# Patient Record
Sex: FEMALE | Race: WHITE | URBAN - METROPOLITAN AREA
[De-identification: names, ages, dates, MRNs, and addresses within clinical notes are randomized per-mention and may not be internally consistent; named-entity substitution may affect disease eponyms.]

---

## 2017-01-17 ENCOUNTER — OFFICE VISIT (OUTPATIENT)
Dept: FAMILY MEDICINE CLINIC | Age: 82
End: 2017-01-17

## 2017-01-17 VITALS — SYSTOLIC BLOOD PRESSURE: 110 MMHG | OXYGEN SATURATION: 97 % | DIASTOLIC BLOOD PRESSURE: 54 MMHG | HEART RATE: 80 BPM

## 2017-01-17 DIAGNOSIS — R29.6 FREQUENT FALLS: ICD-10-CM

## 2017-01-17 DIAGNOSIS — M12.522 TRAUMATIC ARTHRITIS ELBOW, LEFT: Primary | ICD-10-CM

## 2017-01-17 DIAGNOSIS — I10 ESSENTIAL HYPERTENSION: ICD-10-CM

## 2017-01-17 DIAGNOSIS — Z23 NEED FOR INFLUENZA VACCINATION: ICD-10-CM

## 2017-01-17 DIAGNOSIS — R56.9 SEIZURES (HCC): ICD-10-CM

## 2017-01-17 DIAGNOSIS — F32.89 DEPRESSIVE DISORDER, NOT ELSEWHERE CLASSIFIED: ICD-10-CM

## 2017-01-17 DIAGNOSIS — E03.9 HYPOTHYROIDISM, UNSPECIFIED TYPE: ICD-10-CM

## 2017-01-17 PROBLEM — M12.529: Status: ACTIVE | Noted: 2017-01-17

## 2017-01-17 PROCEDURE — 99214 OFFICE O/P EST MOD 30 MIN: CPT | Performed by: FAMILY MEDICINE

## 2017-01-17 PROCEDURE — 90662 IIV NO PRSV INCREASED AG IM: CPT | Performed by: FAMILY MEDICINE

## 2017-01-17 PROCEDURE — G0008 ADMIN INFLUENZA VIRUS VAC: HCPCS | Performed by: FAMILY MEDICINE

## 2017-01-25 RX ORDER — PHENYTOIN SODIUM 100 MG/1
CAPSULE, EXTENDED RELEASE ORAL
Qty: 60 CAPSULE | Refills: 2 | Status: SHIPPED | OUTPATIENT
Start: 2017-01-25 | End: 2017-04-26 | Stop reason: SDUPTHER

## 2017-02-06 RX ORDER — CLOPIDOGREL BISULFATE 75 MG/1
TABLET ORAL
Qty: 90 TABLET | Refills: 1 | Status: SHIPPED | OUTPATIENT
Start: 2017-02-06 | End: 2017-07-31 | Stop reason: SDUPTHER

## 2017-02-06 RX ORDER — LEVOTHYROXINE SODIUM 0.12 MG/1
TABLET ORAL
Qty: 90 TABLET | Refills: 1 | Status: SHIPPED | OUTPATIENT
Start: 2017-02-06 | End: 2017-07-31 | Stop reason: SDUPTHER

## 2017-02-13 ENCOUNTER — TELEPHONE (OUTPATIENT)
Dept: FAMILY MEDICINE CLINIC | Age: 82
End: 2017-02-13

## 2017-02-13 RX ORDER — AMITRIPTYLINE HYDROCHLORIDE 50 MG/1
25 TABLET, FILM COATED ORAL NIGHTLY
Qty: 15 TABLET | Refills: 3 | Status: SHIPPED | OUTPATIENT
Start: 2017-02-13 | End: 2017-02-15 | Stop reason: SDUPTHER

## 2017-02-15 RX ORDER — AMITRIPTYLINE HYDROCHLORIDE 50 MG/1
25 TABLET, FILM COATED ORAL NIGHTLY
Qty: 15 TABLET | Refills: 3 | Status: SHIPPED | OUTPATIENT
Start: 2017-02-15 | End: 2017-04-24 | Stop reason: SDUPTHER

## 2017-02-20 DIAGNOSIS — I10 UNSPECIFIED ESSENTIAL HYPERTENSION: ICD-10-CM

## 2017-02-20 RX ORDER — LISINOPRIL 10 MG/1
10 TABLET ORAL DAILY
Qty: 90 TABLET | Refills: 1 | Status: SHIPPED | OUTPATIENT
Start: 2017-02-20 | End: 2017-07-31 | Stop reason: SDUPTHER

## 2017-02-27 DIAGNOSIS — I10 UNSPECIFIED ESSENTIAL HYPERTENSION: ICD-10-CM

## 2017-02-27 RX ORDER — AMLODIPINE BESYLATE 10 MG/1
5 TABLET ORAL DAILY
Qty: 30 TABLET | Refills: 3 | Status: SHIPPED | OUTPATIENT
Start: 2017-02-27 | End: 2017-05-17 | Stop reason: SDUPTHER

## 2017-02-28 ENCOUNTER — NURSE ONLY (OUTPATIENT)
Dept: FAMILY MEDICINE CLINIC | Age: 82
End: 2017-02-28

## 2017-02-28 ENCOUNTER — TELEPHONE (OUTPATIENT)
Dept: FAMILY MEDICINE CLINIC | Age: 82
End: 2017-02-28

## 2017-02-28 DIAGNOSIS — R10.9 ABDOMINAL PAIN, UNSPECIFIED LOCATION: Primary | ICD-10-CM

## 2017-02-28 LAB
BACTERIA URINE, POC: 0
BILIRUBIN URINE: 0 MG/DL
BLOOD, URINE: NEGATIVE
CASTS URINE, POC: 0
CLARITY: CLEAR
COLOR: YELLOW
CRYSTALS URINE, POC: 0
EPI CELLS URINE, POC: 0
GLUCOSE URINE: 0
KETONES, URINE: NEGATIVE
LEUKOCYTE EST, POC: 0
NITRITE, URINE: NEGATIVE
PH UA: 6.5 (ref 4.5–8)
PROTEIN UA: NEGATIVE
RBC URINE, POC: 0
SPECIFIC GRAVITY UA: 1.01 (ref 1–1.03)
UROBILINOGEN, URINE: NORMAL
WBC URINE, POC: 0
YEAST URINE, POC: 0

## 2017-02-28 PROCEDURE — 81000 URINALYSIS NONAUTO W/SCOPE: CPT | Performed by: PHYSICIAN ASSISTANT

## 2017-03-16 RX ORDER — CYANOCOBALAMIN 1000 UG/ML
1000 INJECTION INTRAMUSCULAR; SUBCUTANEOUS
Qty: 1 ML | Refills: 3 | Status: SHIPPED | OUTPATIENT
Start: 2017-03-16 | End: 2017-03-23 | Stop reason: SDUPTHER

## 2017-03-16 RX ORDER — CYANOCOBALAMIN 1000 UG/ML
1000 INJECTION INTRAMUSCULAR; SUBCUTANEOUS
Qty: 1 ML | Refills: 3 | Status: SHIPPED | OUTPATIENT
Start: 2017-03-16 | End: 2017-03-16 | Stop reason: SDUPTHER

## 2017-03-23 RX ORDER — CYANOCOBALAMIN 1000 UG/ML
1000 INJECTION INTRAMUSCULAR; SUBCUTANEOUS
Qty: 3 ML | Refills: 1 | Status: SHIPPED | OUTPATIENT
Start: 2017-03-23 | End: 2017-11-03 | Stop reason: SDUPTHER

## 2017-03-28 RX ORDER — GABAPENTIN 300 MG/1
300 CAPSULE ORAL NIGHTLY
Qty: 90 CAPSULE | Refills: 1 | Status: SHIPPED | OUTPATIENT
Start: 2017-03-28 | End: 2017-07-31 | Stop reason: SDUPTHER

## 2017-04-24 RX ORDER — AMITRIPTYLINE HYDROCHLORIDE 50 MG/1
25 TABLET, FILM COATED ORAL NIGHTLY
Qty: 45 TABLET | Refills: 0 | Status: SHIPPED | OUTPATIENT
Start: 2017-04-24 | End: 2017-04-25 | Stop reason: SDUPTHER

## 2017-04-25 RX ORDER — DOCUSATE SODIUM 100 MG/1
CAPSULE, LIQUID FILLED ORAL
Qty: 90 CAPSULE | Refills: 0 | Status: SHIPPED | OUTPATIENT
Start: 2017-04-25 | End: 2017-07-24 | Stop reason: SDUPTHER

## 2017-04-25 RX ORDER — AMITRIPTYLINE HYDROCHLORIDE 50 MG/1
25 TABLET, FILM COATED ORAL NIGHTLY
Qty: 45 TABLET | Refills: 0 | Status: SHIPPED | OUTPATIENT
Start: 2017-04-25 | End: 2017-05-17 | Stop reason: SDUPTHER

## 2017-04-26 RX ORDER — PHENYTOIN SODIUM 100 MG/1
CAPSULE, EXTENDED RELEASE ORAL
Qty: 60 CAPSULE | Refills: 2 | Status: SHIPPED | OUTPATIENT
Start: 2017-04-26 | End: 2017-05-17 | Stop reason: SDUPTHER

## 2017-05-17 ENCOUNTER — OFFICE VISIT (OUTPATIENT)
Dept: FAMILY MEDICINE CLINIC | Age: 82
End: 2017-05-17

## 2017-05-17 VITALS
SYSTOLIC BLOOD PRESSURE: 122 MMHG | DIASTOLIC BLOOD PRESSURE: 70 MMHG | HEIGHT: 67 IN | BODY MASS INDEX: 23.93 KG/M2 | RESPIRATION RATE: 16 BRPM | WEIGHT: 152.5 LBS | HEART RATE: 68 BPM

## 2017-05-17 DIAGNOSIS — M19.91 PRIMARY OSTEOARTHRITIS, UNSPECIFIED SITE: ICD-10-CM

## 2017-05-17 DIAGNOSIS — G60.9 IDIOPATHIC PERIPHERAL NEUROPATHY: Primary | ICD-10-CM

## 2017-05-17 DIAGNOSIS — I10 UNSPECIFIED ESSENTIAL HYPERTENSION: ICD-10-CM

## 2017-05-17 DIAGNOSIS — G20 PARKINSON DISEASE (HCC): ICD-10-CM

## 2017-05-17 PROCEDURE — 99214 OFFICE O/P EST MOD 30 MIN: CPT | Performed by: FAMILY MEDICINE

## 2017-05-17 RX ORDER — AMITRIPTYLINE HYDROCHLORIDE 25 MG/1
25 TABLET, FILM COATED ORAL NIGHTLY
Qty: 90 TABLET | Refills: 3 | Status: SHIPPED | OUTPATIENT
Start: 2017-05-17 | End: 2018-05-22 | Stop reason: SDUPTHER

## 2017-05-17 RX ORDER — AMLODIPINE BESYLATE 5 MG/1
5 TABLET ORAL DAILY
Qty: 90 TABLET | Refills: 3 | Status: SHIPPED | OUTPATIENT
Start: 2017-05-17 | End: 2017-07-31 | Stop reason: SDUPTHER

## 2017-05-17 RX ORDER — PHENYTOIN SODIUM 100 MG/1
CAPSULE, EXTENDED RELEASE ORAL
Qty: 180 CAPSULE | Refills: 1 | Status: SHIPPED | OUTPATIENT
Start: 2017-05-17 | End: 2017-10-24 | Stop reason: SDUPTHER

## 2017-05-17 ASSESSMENT — PATIENT HEALTH QUESTIONNAIRE - PHQ9
2. FEELING DOWN, DEPRESSED OR HOPELESS: 0
SUM OF ALL RESPONSES TO PHQ QUESTIONS 1-9: 0
SUM OF ALL RESPONSES TO PHQ9 QUESTIONS 1 & 2: 0
1. LITTLE INTEREST OR PLEASURE IN DOING THINGS: 0

## 2017-07-24 RX ORDER — DOCUSATE SODIUM 100 MG/1
CAPSULE, LIQUID FILLED ORAL
Qty: 90 CAPSULE | Refills: 0 | Status: SHIPPED | OUTPATIENT
Start: 2017-07-24 | End: 2017-10-23 | Stop reason: SDUPTHER

## 2017-07-31 ENCOUNTER — TELEPHONE (OUTPATIENT)
Dept: FAMILY MEDICINE CLINIC | Age: 82
End: 2017-07-31

## 2017-07-31 DIAGNOSIS — I10 UNSPECIFIED ESSENTIAL HYPERTENSION: ICD-10-CM

## 2017-07-31 RX ORDER — GABAPENTIN 300 MG/1
300 CAPSULE ORAL NIGHTLY
Qty: 90 CAPSULE | Refills: 1 | Status: SHIPPED | OUTPATIENT
Start: 2017-07-31 | End: 2018-01-09 | Stop reason: SDUPTHER

## 2017-07-31 RX ORDER — AMLODIPINE BESYLATE 5 MG/1
5 TABLET ORAL DAILY
Qty: 90 TABLET | Refills: 1 | Status: SHIPPED | OUTPATIENT
Start: 2017-07-31 | End: 2017-12-24 | Stop reason: SDUPTHER

## 2017-07-31 RX ORDER — CLOPIDOGREL BISULFATE 75 MG/1
TABLET ORAL
Qty: 90 TABLET | Refills: 1 | Status: SHIPPED | OUTPATIENT
Start: 2017-07-31 | End: 2017-08-01 | Stop reason: SDUPTHER

## 2017-07-31 RX ORDER — LISINOPRIL 10 MG/1
10 TABLET ORAL DAILY
Qty: 90 TABLET | Refills: 1 | Status: SHIPPED | OUTPATIENT
Start: 2017-07-31 | End: 2017-12-11 | Stop reason: SDUPTHER

## 2017-07-31 RX ORDER — LEVOTHYROXINE SODIUM 0.12 MG/1
TABLET ORAL
Qty: 90 TABLET | Refills: 1 | Status: SHIPPED | OUTPATIENT
Start: 2017-07-31 | End: 2017-08-01 | Stop reason: SDUPTHER

## 2017-08-01 RX ORDER — CLOPIDOGREL BISULFATE 75 MG/1
TABLET ORAL
Qty: 90 TABLET | Refills: 1 | Status: SHIPPED | OUTPATIENT
Start: 2017-08-01 | End: 2018-02-27 | Stop reason: SDUPTHER

## 2017-08-01 RX ORDER — LEVOTHYROXINE SODIUM 0.12 MG/1
TABLET ORAL
Qty: 90 TABLET | Refills: 1 | Status: SHIPPED | OUTPATIENT
Start: 2017-08-01 | End: 2017-08-03 | Stop reason: SDUPTHER

## 2017-08-03 RX ORDER — LEVOTHYROXINE SODIUM 0.12 MG/1
TABLET ORAL
Qty: 90 TABLET | Refills: 0 | Status: SHIPPED | OUTPATIENT
Start: 2017-08-03 | End: 2018-02-27 | Stop reason: SDUPTHER

## 2017-09-11 ENCOUNTER — OFFICE VISIT (OUTPATIENT)
Dept: FAMILY MEDICINE CLINIC | Age: 82
End: 2017-09-11

## 2017-09-11 VITALS
WEIGHT: 152 LBS | OXYGEN SATURATION: 98 % | HEART RATE: 80 BPM | BODY MASS INDEX: 23.81 KG/M2 | SYSTOLIC BLOOD PRESSURE: 122 MMHG | DIASTOLIC BLOOD PRESSURE: 70 MMHG

## 2017-09-11 DIAGNOSIS — I10 ESSENTIAL HYPERTENSION: ICD-10-CM

## 2017-09-11 DIAGNOSIS — G20 PARKINSON DISEASE (HCC): ICD-10-CM

## 2017-09-11 DIAGNOSIS — G60.9 IDIOPATHIC PERIPHERAL NEUROPATHY: ICD-10-CM

## 2017-09-11 DIAGNOSIS — M19.91 PRIMARY OSTEOARTHRITIS, UNSPECIFIED SITE: Primary | ICD-10-CM

## 2017-09-11 DIAGNOSIS — Z23 NEED FOR INFLUENZA VACCINATION: ICD-10-CM

## 2017-09-11 DIAGNOSIS — G93.32 CHRONIC FATIGUE SYNDROME: ICD-10-CM

## 2017-09-11 PROCEDURE — 90662 IIV NO PRSV INCREASED AG IM: CPT | Performed by: FAMILY MEDICINE

## 2017-09-11 PROCEDURE — 99214 OFFICE O/P EST MOD 30 MIN: CPT | Performed by: FAMILY MEDICINE

## 2017-09-11 PROCEDURE — G0008 ADMIN INFLUENZA VIRUS VAC: HCPCS | Performed by: FAMILY MEDICINE

## 2017-10-23 RX ORDER — DOCUSATE SODIUM 100 MG/1
CAPSULE, LIQUID FILLED ORAL
Qty: 90 CAPSULE | Refills: 1 | Status: SHIPPED | OUTPATIENT
Start: 2017-10-23 | End: 2018-04-18 | Stop reason: SDUPTHER

## 2017-10-24 RX ORDER — PHENYTOIN SODIUM 100 MG/1
CAPSULE, EXTENDED RELEASE ORAL
Qty: 180 CAPSULE | Refills: 1 | Status: SHIPPED | OUTPATIENT
Start: 2017-10-24 | End: 2017-10-26 | Stop reason: SDUPTHER

## 2017-10-26 ENCOUNTER — TELEPHONE (OUTPATIENT)
Dept: FAMILY MEDICINE CLINIC | Age: 82
End: 2017-10-26

## 2017-10-26 RX ORDER — PHENYTOIN SODIUM 100 MG/1
CAPSULE, EXTENDED RELEASE ORAL
Qty: 14 CAPSULE | Refills: 0 | Status: SHIPPED | OUTPATIENT
Start: 2017-10-26 | End: 2018-01-17

## 2017-10-26 NOTE — TELEPHONE ENCOUNTER
[facility] called concerning the prescrition for    phenytoin (DILANTIN) 100 MG ER capsule [[id number]]    This was sent to the wrong pharmacy. It went to [facility] but it needs to go to [facility] on [address]. Please resend.  Patient is almost out of medication

## 2017-11-03 RX ORDER — CYANOCOBALAMIN 1000 UG/ML
1000 INJECTION INTRAMUSCULAR; SUBCUTANEOUS
Qty: 3 ML | Refills: 1 | Status: SHIPPED | OUTPATIENT
Start: 2017-11-03 | End: 2018-04-24 | Stop reason: SDUPTHER

## 2017-11-03 RX ORDER — CYANOCOBALAMIN 1000 UG/ML
1000 INJECTION INTRAMUSCULAR; SUBCUTANEOUS
Qty: 3 ML | Refills: 1 | Status: SHIPPED | OUTPATIENT
Start: 2017-11-03 | End: 2017-11-03 | Stop reason: SDUPTHER

## 2017-12-11 DIAGNOSIS — I10 ESSENTIAL HYPERTENSION: ICD-10-CM

## 2017-12-11 RX ORDER — LISINOPRIL 10 MG/1
10 TABLET ORAL DAILY
Qty: 90 TABLET | Refills: 0 | Status: SHIPPED | OUTPATIENT
Start: 2017-12-11 | End: 2018-02-27 | Stop reason: SDUPTHER

## 2017-12-11 RX ORDER — LEVOTHYROXINE SODIUM 0.12 MG/1
TABLET ORAL
Qty: 90 TABLET | Refills: 0 | Status: SHIPPED | OUTPATIENT
Start: 2017-12-11 | End: 2018-01-17

## 2017-12-11 RX ORDER — CLOPIDOGREL BISULFATE 75 MG/1
TABLET ORAL
Qty: 90 TABLET | Refills: 0 | Status: SHIPPED | OUTPATIENT
Start: 2017-12-11 | End: 2018-01-17

## 2017-12-14 ENCOUNTER — OFFICE VISIT (OUTPATIENT)
Dept: FAMILY MEDICINE CLINIC | Age: 82
End: 2017-12-14

## 2017-12-14 VITALS
WEIGHT: 154 LBS | BODY MASS INDEX: 24.12 KG/M2 | OXYGEN SATURATION: 96 % | SYSTOLIC BLOOD PRESSURE: 118 MMHG | DIASTOLIC BLOOD PRESSURE: 72 MMHG | HEART RATE: 76 BPM | TEMPERATURE: 96.1 F

## 2017-12-14 DIAGNOSIS — J06.9 UPPER RESPIRATORY TRACT INFECTION, UNSPECIFIED TYPE: Primary | ICD-10-CM

## 2017-12-14 PROCEDURE — 99213 OFFICE O/P EST LOW 20 MIN: CPT | Performed by: PHYSICIAN ASSISTANT

## 2017-12-14 PROCEDURE — 1036F TOBACCO NON-USER: CPT | Performed by: PHYSICIAN ASSISTANT

## 2017-12-14 PROCEDURE — G8420 CALC BMI NORM PARAMETERS: HCPCS | Performed by: PHYSICIAN ASSISTANT

## 2017-12-14 PROCEDURE — 4040F PNEUMOC VAC/ADMIN/RCVD: CPT | Performed by: PHYSICIAN ASSISTANT

## 2017-12-14 PROCEDURE — 1090F PRES/ABSN URINE INCON ASSESS: CPT | Performed by: PHYSICIAN ASSISTANT

## 2017-12-14 PROCEDURE — G8598 ASA/ANTIPLAT THER USED: HCPCS | Performed by: PHYSICIAN ASSISTANT

## 2017-12-14 PROCEDURE — 1123F ACP DISCUSS/DSCN MKR DOCD: CPT | Performed by: PHYSICIAN ASSISTANT

## 2017-12-14 PROCEDURE — G8427 DOCREV CUR MEDS BY ELIG CLIN: HCPCS | Performed by: PHYSICIAN ASSISTANT

## 2017-12-14 PROCEDURE — G8484 FLU IMMUNIZE NO ADMIN: HCPCS | Performed by: PHYSICIAN ASSISTANT

## 2017-12-14 RX ORDER — CEPHALEXIN 500 MG/1
500 CAPSULE ORAL 3 TIMES DAILY
Qty: 30 CAPSULE | Refills: 0 | Status: SHIPPED | OUTPATIENT
Start: 2017-12-14 | End: 2018-01-17

## 2017-12-14 ASSESSMENT — ENCOUNTER SYMPTOMS
NAUSEA: 0
COUGH: 1
SINUS PAIN: 1
WHEEZING: 0
VOMITING: 0
RHINORRHEA: 0
SINUS PRESSURE: 1
SORE THROAT: 1
DIARRHEA: 0

## 2017-12-14 NOTE — PROGRESS NOTES
Subjective:      Patient ID: Dominga Blancas is a 80 y.o. female. HPI Patient is here today with upper respiratory complaints. She just got over a GI illness. She had nausea but no vomiting or diarrhea. Started with a ST 3 days ago. She has a productive cough cough with yellow sputum. She has a mild runny nose, not stuffy. No ear pain. No fever but she felt hot yesterday. She does get SOB when moving around but a little more than usual. She has taken benadryl and mucinex DM. She is a nonsmoker. She is around smoke in the house. Review of Systems   Constitutional: Positive for appetite change and fatigue. Negative for fever. HENT: Positive for congestion, postnasal drip, sinus pain, sinus pressure and sore throat. Negative for ear pain and rhinorrhea. Respiratory: Positive for cough. Negative for wheezing. Gastrointestinal: Negative for diarrhea, nausea and vomiting. Musculoskeletal: Negative for myalgias. Skin: Negative for rash. Neurological: Positive for weakness. Negative for dizziness and headaches. Objective:   Physical Exam   Constitutional: She is oriented to person, place, and time. Vital signs are normal. She appears well-developed and well-nourished. She is cooperative. HENT:   Head: Normocephalic. Right Ear: Tympanic membrane and ear canal normal.   Left Ear: Tympanic membrane and ear canal normal.   Nose: Mucosal edema present. Right sinus exhibits no maxillary sinus tenderness and no frontal sinus tenderness. Left sinus exhibits no maxillary sinus tenderness and no frontal sinus tenderness. Mouth/Throat: Oropharynx is clear and moist and mucous membranes are normal.   Neck: Neck supple. Cardiovascular: Normal rate, regular rhythm and normal heart sounds. Pulmonary/Chest: Effort normal and breath sounds normal. No respiratory distress. She has no decreased breath sounds. Lymphadenopathy:     She has no cervical adenopathy.    Neurological: She is alert and oriented to person, place, and time. Assessment:      Zeb Severs was seen today for uri. Diagnoses and all orders for this visit:    Upper respiratory tract infection, unspecified type  -     cephALEXin (KEFLEX) 500 MG capsule; Take 1 capsule by mouth 3 times daily             Plan:      Hold Keflex for 2-3 more days and if not improving or worsening, then start keflex. Call with any concerns.

## 2017-12-14 NOTE — PATIENT INSTRUCTIONS
Lenoria Blizzard was seen today for uri. Diagnoses and all orders for this visit:    Upper respiratory tract infection, unspecified type  -     cephALEXin (KEFLEX) 500 MG capsule; Take 1 capsule by mouth 3 times daily       Hold the antibiotic for 2-3 more days, if not improving or worsening, then start Keflex.

## 2017-12-24 DIAGNOSIS — I10 ESSENTIAL HYPERTENSION: ICD-10-CM

## 2017-12-26 RX ORDER — AMLODIPINE BESYLATE 5 MG/1
5 TABLET ORAL DAILY
Qty: 90 TABLET | Refills: 0 | Status: SHIPPED | OUTPATIENT
Start: 2017-12-26 | End: 2018-02-27 | Stop reason: SDUPTHER

## 2018-01-12 RX ORDER — GABAPENTIN 300 MG/1
300 CAPSULE ORAL NIGHTLY
Qty: 90 CAPSULE | Refills: 0 | Status: ON HOLD | OUTPATIENT
Start: 2018-01-12 | End: 2018-07-19 | Stop reason: HOSPADM

## 2018-01-12 RX ORDER — PHENYTOIN SODIUM 100 MG/1
CAPSULE, EXTENDED RELEASE ORAL
Qty: 180 CAPSULE | Refills: 0 | Status: SHIPPED | OUTPATIENT
Start: 2018-01-12 | End: 2018-08-19 | Stop reason: SDUPTHER

## 2018-01-17 ENCOUNTER — OFFICE VISIT (OUTPATIENT)
Dept: FAMILY MEDICINE CLINIC | Age: 83
End: 2018-01-17

## 2018-01-17 VITALS
HEIGHT: 67 IN | BODY MASS INDEX: 23.23 KG/M2 | DIASTOLIC BLOOD PRESSURE: 60 MMHG | HEART RATE: 80 BPM | SYSTOLIC BLOOD PRESSURE: 104 MMHG | WEIGHT: 148 LBS

## 2018-01-17 DIAGNOSIS — G60.9 IDIOPATHIC PERIPHERAL NEUROPATHY: ICD-10-CM

## 2018-01-17 DIAGNOSIS — M19.91 PRIMARY OSTEOARTHRITIS, UNSPECIFIED SITE: Primary | ICD-10-CM

## 2018-01-17 DIAGNOSIS — R29.6 FREQUENT FALLS: ICD-10-CM

## 2018-01-17 DIAGNOSIS — R56.9 SEIZURES (HCC): ICD-10-CM

## 2018-01-17 DIAGNOSIS — F03.91 DEMENTIA WITH BEHAVIORAL DISTURBANCE, UNSPECIFIED DEMENTIA TYPE: ICD-10-CM

## 2018-01-17 DIAGNOSIS — G20 PARKINSON DISEASE (HCC): ICD-10-CM

## 2018-01-17 DIAGNOSIS — I10 ESSENTIAL HYPERTENSION: ICD-10-CM

## 2018-01-17 DIAGNOSIS — K64.9 HEMORRHOIDS, UNSPECIFIED HEMORRHOID TYPE: ICD-10-CM

## 2018-01-17 PROBLEM — F03.918 DEMENTIA WITH BEHAVIORAL DISTURBANCE: Status: ACTIVE | Noted: 2018-01-17

## 2018-01-17 PROCEDURE — G8420 CALC BMI NORM PARAMETERS: HCPCS | Performed by: FAMILY MEDICINE

## 2018-01-17 PROCEDURE — G8598 ASA/ANTIPLAT THER USED: HCPCS | Performed by: FAMILY MEDICINE

## 2018-01-17 PROCEDURE — 1090F PRES/ABSN URINE INCON ASSESS: CPT | Performed by: FAMILY MEDICINE

## 2018-01-17 PROCEDURE — G8484 FLU IMMUNIZE NO ADMIN: HCPCS | Performed by: FAMILY MEDICINE

## 2018-01-17 PROCEDURE — G8427 DOCREV CUR MEDS BY ELIG CLIN: HCPCS | Performed by: FAMILY MEDICINE

## 2018-01-17 PROCEDURE — 1036F TOBACCO NON-USER: CPT | Performed by: FAMILY MEDICINE

## 2018-01-17 PROCEDURE — 99214 OFFICE O/P EST MOD 30 MIN: CPT | Performed by: FAMILY MEDICINE

## 2018-01-17 PROCEDURE — 4040F PNEUMOC VAC/ADMIN/RCVD: CPT | Performed by: FAMILY MEDICINE

## 2018-01-17 PROCEDURE — 1123F ACP DISCUSS/DSCN MKR DOCD: CPT | Performed by: FAMILY MEDICINE

## 2018-01-17 RX ORDER — QUETIAPINE FUMARATE 25 MG/1
25 TABLET, FILM COATED ORAL NIGHTLY
Qty: 30 TABLET | Refills: 3 | Status: SHIPPED | OUTPATIENT
Start: 2018-01-17 | End: 2018-03-22 | Stop reason: SDUPTHER

## 2018-01-17 NOTE — PROGRESS NOTES
days 3 mL 1    docusate sodium (COLACE) 100 MG capsule TAKE 1 CAPSULE BY MOUTH DAILY. 90 capsule 1    levothyroxine (SYNTHROID) 125 MCG tablet TAKE 1 TABLET EVERY DAY 90 tablet 0    clopidogrel (PLAVIX) 75 MG tablet TAKE 1 TABLET EVERY DAY 90 tablet 1    amitriptyline (ELAVIL) 25 MG tablet Take 1 tablet by mouth nightly 90 tablet 3    ondansetron (ZOFRAN) 4 MG tablet TAKE 1 TABLET BY MOUTH DAILY AS NEEDED FOR NAUSEA OR VOMITING 30 tablet 0    Dextromethorphan-Guaifenesin (MUCINEX DM)  MG TB12 Take by mouth nightly      diphenhydrAMINE (BENADRYL) 25 MG tablet Take 25 mg by mouth nightly as needed for Allergies         Allergies   Allergen Reactions    Aspirin Other (See Comments)     unknown    Butalbital-Aspirin-Caffeine Other (See Comments)     unknown    Daypro [Oxaprozin] Other (See Comments)     unknown    Diclofenac Sodium Other (See Comments)     unknown    Erythromycin Swelling    Naprosyn [Naproxen]     Penicillins      Pt denies allergy to pcn    Prinivil [Lisinopril] Other (See Comments)     Cough     Sulfa Antibiotics Swelling       Social History   Substance Use Topics    Smoking status: Never Smoker    Smokeless tobacco: Never Used    Alcohol use No       /60   Pulse 80   Wt 148 lb (67.1 kg)   BMI 23.18 kg/m²         Objective:   Physical Exam   Constitutional: She appears well-developed and well-nourished. She is cooperative. Neck: Carotid bruit is not present. Cardiovascular: Normal rate, regular rhythm and normal heart sounds. No murmur heard. Pulses:       Dorsalis pedis pulses are 2+ on the right side, and 2+ on the left side. Posterior tibial pulses are 2+ on the right side, and 2+ on the left side. Pulmonary/Chest: Effort normal and breath sounds normal.   Abdominal: Soft. Normal appearance and bowel sounds are normal. She exhibits no distension and no mass. There is no hepatosplenomegaly. There is no tenderness.  There is no rigidity, no rebound, no guarding and no CVA tenderness. No hernia. Musculoskeletal: She exhibits no edema. Right knee: She exhibits deformity (Gross crepitus). She exhibits normal range of motion. Left knee: She exhibits normal range of motion. Neurological: She is alert. She has normal strength. She displays tremor. No sensory deficit. Pill-rolling tremor noted of right hand and twitching of her right leg   Psychiatric: Her speech is normal and behavior is normal. Judgment and thought content normal. Her mood appears not anxious. Cognition and memory are normal. She does not exhibit a depressed mood. Assessment:      Jennifer Mills was seen today for follow-up. Diagnoses and all orders for this visit:    Primary osteoarthritis, unspecified site    Frequent falls    Dementia with behavioral disturbance, unspecified dementia type    Idiopathic peripheral neuropathy    Seizures (HCC)    Parkinson disease ([facility] [address]    Essential hypertension    Hemorrhoids, unspecified hemorrhoid type    Other orders  -     QUEtiapine (SEROQUEL) 25 MG tablet; Take 1 tablet by mouth nightly    OARRS report checked          Plan:      Pt appears stable Except for the dementia and hallucinations & reviewed labs with patient. Discussed with patient and family that this is secondary to the dementia and Parkinson disease. She is doing well in regards to her Parkinson disease without treatment. Encourage her to continue using walker and family's assistance for safety reasons. Maintain stool softeners as she has been doing. Patient received counseling on the following healthy behaviors: nutrition and exercise     Patient given educational materials     Discussed use, benefit, and side effects of prescribed medications. Barriers to medication compliance addressed. All patient questions answered. Pt voiced understanding. Patient needs RTC in 4 months. Please note that this chart was generated using Dragon dictation software.  Although

## 2018-01-17 NOTE — PATIENT INSTRUCTIONS
Patient Education        Neck Arthritis: Exercises  Your Care Instructions  Here are some examples of typical rehabilitation exercises for your condition. Start each exercise slowly. Ease off the exercise if you start to have pain. Your doctor or physical therapist will tell you when you can start these exercises and which ones will work best for you. How to do the exercises  Neck stretches to the side    1. This stretch works best if you keep your shoulder down as you lean away from it. To help you remember to do this, start by relaxing your shoulders and lightly holding on to your thighs or your chair. 2. Tilt your head toward your shoulder and hold for 15 to 30 seconds. Let the weight of your head stretch your muscles. 3. Repeat 2 to 4 times toward each shoulder. Chin tuck    1. Lie on the floor with a rolled-up towel under your neck. Your head should be touching the floor. 2. Slowly bring your chin toward your chest.  3. Hold for a count of 6, and then relax for up to 10 seconds. 4. Repeat 8 to 12 times. Active cervical rotation    1. Sit in a firm chair, or stand up straight. 2. Keeping your chin level, turn your head to the right, and hold for 15 to 30 seconds. 3. Turn your head to the left and hold for 15 to 30 seconds. 4. Repeat 2 to 4 times to each side. Shoulder blade squeeze    1. While standing, squeeze your shoulder blades together. 2. Do not raise your shoulders up as you are squeezing. 3. Hold for 6 seconds. 4. Repeat 8 to 12 times. Shoulder rolls    1. Sit comfortably with your feet shoulder-width apart. You can also do this exercise standing up. 2. Roll your shoulders up, then back, and then down in a smooth, circular motion. 3. Repeat 2 to 4 times. Follow-up care is a key part of your treatment and safety. Be sure to make and go to all appointments, and call your doctor if you are having problems.  It's also a good idea to know your test results and keep a list of the medicines

## 2018-01-19 PROBLEM — K64.9 HEMORRHOIDS: Status: ACTIVE | Noted: 2018-01-19

## 2018-02-27 DIAGNOSIS — I10 ESSENTIAL HYPERTENSION: ICD-10-CM

## 2018-02-27 RX ORDER — CLOPIDOGREL BISULFATE 75 MG/1
TABLET ORAL
Qty: 90 TABLET | Refills: 1 | Status: SHIPPED | OUTPATIENT
Start: 2018-02-27 | End: 2018-10-24 | Stop reason: SDUPTHER

## 2018-02-27 RX ORDER — LISINOPRIL 10 MG/1
10 TABLET ORAL DAILY
Qty: 90 TABLET | Refills: 1 | Status: SHIPPED | OUTPATIENT
Start: 2018-02-27 | End: 2018-10-24 | Stop reason: SDUPTHER

## 2018-02-27 RX ORDER — AMLODIPINE BESYLATE 5 MG/1
5 TABLET ORAL DAILY
Qty: 90 TABLET | Refills: 1 | Status: SHIPPED | OUTPATIENT
Start: 2018-02-27 | End: 2018-10-24 | Stop reason: SDUPTHER

## 2018-02-27 RX ORDER — LEVOTHYROXINE SODIUM 0.12 MG/1
TABLET ORAL
Qty: 90 TABLET | Refills: 1 | Status: SHIPPED | OUTPATIENT
Start: 2018-02-27 | End: 2018-10-24 | Stop reason: SDUPTHER

## 2018-03-22 ENCOUNTER — TELEPHONE (OUTPATIENT)
Dept: FAMILY MEDICINE CLINIC | Age: 83
End: 2018-03-22

## 2018-03-22 RX ORDER — QUETIAPINE FUMARATE 50 MG/1
50 TABLET, FILM COATED ORAL NIGHTLY
Qty: 30 TABLET | Refills: 0
Start: 2018-03-22 | End: 2018-03-28 | Stop reason: SDUPTHER

## 2018-03-28 RX ORDER — QUETIAPINE FUMARATE 50 MG/1
50 TABLET, FILM COATED ORAL NIGHTLY
Qty: 90 TABLET | Refills: 0 | Status: SHIPPED | OUTPATIENT
Start: 2018-03-28 | End: 2018-07-01 | Stop reason: SDUPTHER

## 2018-04-18 RX ORDER — DOCUSATE SODIUM 100 MG/1
CAPSULE, LIQUID FILLED ORAL
Qty: 90 CAPSULE | Refills: 1 | Status: SHIPPED | OUTPATIENT
Start: 2018-04-18 | End: 2018-11-05 | Stop reason: SDUPTHER

## 2018-04-24 RX ORDER — CYANOCOBALAMIN 1000 UG/ML
1000 INJECTION INTRAMUSCULAR; SUBCUTANEOUS
Qty: 3 ML | Refills: 1 | Status: SHIPPED | OUTPATIENT
Start: 2018-04-24 | End: 2018-12-17 | Stop reason: SDUPTHER

## 2018-05-17 ENCOUNTER — OFFICE VISIT (OUTPATIENT)
Dept: FAMILY MEDICINE CLINIC | Age: 83
End: 2018-05-17

## 2018-05-17 VITALS
HEART RATE: 70 BPM | OXYGEN SATURATION: 98 % | SYSTOLIC BLOOD PRESSURE: 110 MMHG | BODY MASS INDEX: 23.21 KG/M2 | DIASTOLIC BLOOD PRESSURE: 60 MMHG | WEIGHT: 146 LBS

## 2018-05-17 DIAGNOSIS — L08.9 LEG ABRASION, INFECTED, LEFT, INITIAL ENCOUNTER: ICD-10-CM

## 2018-05-17 DIAGNOSIS — R35.0 URINARY FREQUENCY: ICD-10-CM

## 2018-05-17 DIAGNOSIS — I10 ESSENTIAL HYPERTENSION: ICD-10-CM

## 2018-05-17 DIAGNOSIS — F03.91 DEMENTIA WITH BEHAVIORAL DISTURBANCE, UNSPECIFIED DEMENTIA TYPE: ICD-10-CM

## 2018-05-17 DIAGNOSIS — G20 PARKINSON DISEASE (HCC): ICD-10-CM

## 2018-05-17 DIAGNOSIS — M19.91 PRIMARY OSTEOARTHRITIS, UNSPECIFIED SITE: ICD-10-CM

## 2018-05-17 DIAGNOSIS — R53.1 WEAKNESS GENERALIZED: ICD-10-CM

## 2018-05-17 DIAGNOSIS — N30.00 ACUTE CYSTITIS WITHOUT HEMATURIA: Primary | ICD-10-CM

## 2018-05-17 DIAGNOSIS — S80.812A LEG ABRASION, INFECTED, LEFT, INITIAL ENCOUNTER: ICD-10-CM

## 2018-05-17 LAB
BACTERIA URINE, POC: ABNORMAL
BILIRUBIN URINE: 0 MG/DL
BLOOD, URINE: POSITIVE
CASTS URINE, POC: ABNORMAL
CLARITY: ABNORMAL
COLOR: YELLOW
CRYSTALS URINE, POC: ABNORMAL
EPI CELLS URINE, POC: ABNORMAL
GLUCOSE URINE: ABNORMAL
KETONES, URINE: NEGATIVE
LEUKOCYTE EST, POC: ABNORMAL
NITRITE, URINE: POSITIVE
PH UA: 6.5 (ref 4.5–8)
PROTEIN UA: POSITIVE
RBC URINE, POC: ABNORMAL
SPECIFIC GRAVITY UA: 1.01 (ref 1–1.03)
UROBILINOGEN, URINE: NORMAL
WBC URINE, POC: ABNORMAL
YEAST URINE, POC: ABNORMAL

## 2018-05-17 PROCEDURE — 81000 URINALYSIS NONAUTO W/SCOPE: CPT | Performed by: FAMILY MEDICINE

## 2018-05-17 PROCEDURE — G8598 ASA/ANTIPLAT THER USED: HCPCS | Performed by: FAMILY MEDICINE

## 2018-05-17 PROCEDURE — 4040F PNEUMOC VAC/ADMIN/RCVD: CPT | Performed by: FAMILY MEDICINE

## 2018-05-17 PROCEDURE — 99214 OFFICE O/P EST MOD 30 MIN: CPT | Performed by: FAMILY MEDICINE

## 2018-05-17 PROCEDURE — G8420 CALC BMI NORM PARAMETERS: HCPCS | Performed by: FAMILY MEDICINE

## 2018-05-17 PROCEDURE — 1036F TOBACCO NON-USER: CPT | Performed by: FAMILY MEDICINE

## 2018-05-17 PROCEDURE — G8428 CUR MEDS NOT DOCUMENT: HCPCS | Performed by: FAMILY MEDICINE

## 2018-05-17 PROCEDURE — 1123F ACP DISCUSS/DSCN MKR DOCD: CPT | Performed by: FAMILY MEDICINE

## 2018-05-17 PROCEDURE — 1090F PRES/ABSN URINE INCON ASSESS: CPT | Performed by: FAMILY MEDICINE

## 2018-05-17 RX ORDER — CIPROFLOXACIN 500 MG/1
500 TABLET, FILM COATED ORAL 2 TIMES DAILY
Qty: 10 TABLET | Refills: 0 | Status: SHIPPED | OUTPATIENT
Start: 2018-05-17 | End: 2018-05-22

## 2018-05-22 RX ORDER — AMITRIPTYLINE HYDROCHLORIDE 25 MG/1
25 TABLET, FILM COATED ORAL NIGHTLY
Qty: 90 TABLET | Refills: 1 | Status: SHIPPED | OUTPATIENT
Start: 2018-05-22 | End: 2018-12-17 | Stop reason: SDUPTHER

## 2018-05-25 ENCOUNTER — TELEPHONE (OUTPATIENT)
Dept: FAMILY MEDICINE CLINIC | Age: 83
End: 2018-05-25

## 2018-07-02 RX ORDER — QUETIAPINE FUMARATE 50 MG/1
50 TABLET, FILM COATED ORAL NIGHTLY
Qty: 90 TABLET | Refills: 0 | Status: SHIPPED | OUTPATIENT
Start: 2018-07-02 | End: 2018-10-01 | Stop reason: SDUPTHER

## 2018-07-12 ENCOUNTER — TELEPHONE (OUTPATIENT)
Dept: FAMILY MEDICINE CLINIC | Age: 83
End: 2018-07-12

## 2018-07-12 NOTE — TELEPHONE ENCOUNTER
Anish Hensley states pt missed her B12 inj last month - wants to know if ok to get tomorrow and then again in 2 weeks to keep her on the same schedule - or ok to skip last months (due 27th)    Please call and advise

## 2018-07-14 PROBLEM — R41.82 ALTERED MENTAL STATE: Status: ACTIVE | Noted: 2018-07-14

## 2018-07-25 ENCOUNTER — TELEPHONE (OUTPATIENT)
Dept: ORTHOPEDIC SURGERY | Age: 83
End: 2018-07-25

## 2018-08-02 ENCOUNTER — CARE COORDINATION (OUTPATIENT)
Dept: CASE MANAGEMENT | Age: 83
End: 2018-08-02

## 2018-08-02 ENCOUNTER — OFFICE VISIT (OUTPATIENT)
Dept: ORTHOPEDIC SURGERY | Age: 83
End: 2018-08-02

## 2018-08-02 VITALS — WEIGHT: 146 LBS | BODY MASS INDEX: 24.92 KG/M2 | HEIGHT: 64 IN

## 2018-08-02 DIAGNOSIS — S72.141A DISPLACED INTERTROCHANTERIC FRACTURE OF RIGHT FEMUR, INITIAL ENCOUNTER FOR CLOSED FRACTURE (HCC): Primary | ICD-10-CM

## 2018-08-02 PROCEDURE — 99024 POSTOP FOLLOW-UP VISIT: CPT | Performed by: ORTHOPAEDIC SURGERY

## 2018-08-02 NOTE — PROGRESS NOTES
DIAGNOSIS:  Right intertrochanteric femur comminuted fracture, status post Gamma nail. DATE OF SURGERY:  7/17/2018 . HISTORY OF PRESENT ILLNESS: Ms. Lázaro Ferguson 80 y.o.  female  who came in today for 2 weeks postoperative visit. The patient denies any significant pain in the right hip. She has been working on ROM and WBAT. No numbness or tingling sensation. No fever or Chills. PHYSICAL EXAMINATION:  The incision is healed well, right hip. No signs of any erythema or drainage. She has no pain with the active or passive range of motion of the right hip. She has intact sensation, distally, and is neurovascularly intact. IMAGING:  Two views right hip and femur, and AP pelvis showed anatomic alignment of the intertrochanteric fracture, Gamma nail in good position, no loosening, or hardware failure. IMPRESSION:  2 weeks out from right Gamma nail intertrochanteric femur comminuted fracture and doing very well. PLAN:  I have told the patient to work on ROM with  PT, WBAT as well as strengthening exercises. The patient will come back for a follow up in 6 weeks. At that time, we will take two views right hip, and AP pelvis. As this patient has demonstrated risk factors for osteoporosis, such as age greater than [de-identified] years and evidence of a fracture, I have referred the patient back to the primary care physician for evaluation for osteoporosis, including consideration for DEXA scanning, if this is felt to be clinically indicated. The patient is advised to contact the primary care physician to follow-up for further evaluation.        Amita Talavera MD

## 2018-08-20 RX ORDER — GABAPENTIN 300 MG/1
CAPSULE ORAL
Qty: 90 CAPSULE | Refills: 1 | Status: SHIPPED | OUTPATIENT
Start: 2018-08-20 | End: 2019-01-14 | Stop reason: SDUPTHER

## 2018-08-20 RX ORDER — PHENYTOIN SODIUM 100 MG/1
CAPSULE, EXTENDED RELEASE ORAL
Qty: 180 CAPSULE | Refills: 0 | Status: SHIPPED | OUTPATIENT
Start: 2018-08-20 | End: 2018-10-24 | Stop reason: SDUPTHER

## 2018-08-22 ENCOUNTER — CARE COORDINATION (OUTPATIENT)
Dept: CASE MANAGEMENT | Age: 83
End: 2018-08-22

## 2018-08-22 DIAGNOSIS — S72.141A DISPLACED INTERTROCHANTERIC FRACTURE OF RIGHT FEMUR, INITIAL ENCOUNTER FOR CLOSED FRACTURE (HCC): Primary | ICD-10-CM

## 2018-08-22 NOTE — CARE COORDINATION
[facility] Transitions Initial Follow Up Call    Call within 2 business days of discharge: Yes    Patient: Danae Finley Patient : [id number]   MRN: <[id number]>    Discharge Date: 18 RARS: Readmission Risk Score: 21     Spoke with: [address]: [address] [facility]    Non-face-to-face services provided:  Obtained and reviewed discharge summary and/or continuity of care documents    Care Transitions 24 Hour Call    Do you have any ongoing symptoms?:  Yes  Patient-reported symptoms:  Pain  Do you have a copy of your discharge instructions?:  Yes  Do you have all of your prescriptions and are they filled?:  Yes  Have you been contacted by a [facility] Pharmacist?:  No  Have you scheduled your follow up appointment?:  Yes (Comment: Ortho Dr. Evert Da Silva)  How are you going to get to your appointment?:  Car - family or friend to transport (Comment: Daughter)  Were you discharged with any Home Care or Post Acute Services:  Yes  Post Acute Services:  Home Health (Comment: Tejinder)  Do you feel like you have everything you need to keep you well at home?:  Yes  Care Transitions Interventions     Daughter and caregiver Seth Booker reports patient is doing well. Patient ambulates using a cane, has a walker if needed. Patient seldom complains of pain. Patient receives Ibuprofen 800 mg in the am and pm. PCP appointment scheduled 18. Patient active with [facility] for nursing/PT/OT. Medications reviewed. Post acute care coordinator signing off.  JENNIFER Roque RN  Care Transition Coordinator  [phone]      Follow Up  Future Appointments  Date Time Provider Winnie Baca   2018 11:00 AM Paige White MD [facility]   2018 2:10 PM Paige White MD [facility]   2018 10:45 AM Darwin Villarreal MD  Ortho [facility]       Gio Grey, RN

## 2018-09-12 ENCOUNTER — TELEPHONE (OUTPATIENT)
Dept: FAMILY MEDICINE CLINIC | Age: 83
End: 2018-09-12

## 2018-10-01 RX ORDER — QUETIAPINE FUMARATE 50 MG/1
50 TABLET, FILM COATED ORAL NIGHTLY
Qty: 90 TABLET | Refills: 0 | Status: SHIPPED | OUTPATIENT
Start: 2018-10-01 | End: 2019-01-28 | Stop reason: SDUPTHER

## 2018-10-24 ENCOUNTER — OFFICE VISIT (OUTPATIENT)
Dept: FAMILY MEDICINE CLINIC | Age: 83
End: 2018-10-24
Payer: MEDICARE

## 2018-10-24 VITALS
DIASTOLIC BLOOD PRESSURE: 70 MMHG | WEIGHT: 142 LBS | OXYGEN SATURATION: 90 % | HEART RATE: 89 BPM | BODY MASS INDEX: 24.37 KG/M2 | SYSTOLIC BLOOD PRESSURE: 112 MMHG | RESPIRATION RATE: 12 BRPM

## 2018-10-24 DIAGNOSIS — F32.5 MAJOR DEPRESSIVE DISORDER WITH SINGLE EPISODE, IN FULL REMISSION (HCC): ICD-10-CM

## 2018-10-24 DIAGNOSIS — Z23 NEED FOR INFLUENZA VACCINATION: ICD-10-CM

## 2018-10-24 DIAGNOSIS — R29.6 FREQUENT FALLS: ICD-10-CM

## 2018-10-24 DIAGNOSIS — I10 ESSENTIAL HYPERTENSION: Primary | ICD-10-CM

## 2018-10-24 DIAGNOSIS — G20 PARKINSON DISEASE (HCC): ICD-10-CM

## 2018-10-24 DIAGNOSIS — E03.9 HYPOTHYROIDISM, UNSPECIFIED TYPE: ICD-10-CM

## 2018-10-24 DIAGNOSIS — F03.91 DEMENTIA WITH BEHAVIORAL DISTURBANCE, UNSPECIFIED DEMENTIA TYPE: ICD-10-CM

## 2018-10-24 PROBLEM — R41.82 ALTERED MENTAL STATE: Status: RESOLVED | Noted: 2018-07-14 | Resolved: 2018-10-24

## 2018-10-24 PROBLEM — M12.529: Status: RESOLVED | Noted: 2017-01-17 | Resolved: 2018-10-24

## 2018-10-24 PROCEDURE — 90662 IIV NO PRSV INCREASED AG IM: CPT | Performed by: FAMILY MEDICINE

## 2018-10-24 PROCEDURE — G8427 DOCREV CUR MEDS BY ELIG CLIN: HCPCS | Performed by: FAMILY MEDICINE

## 2018-10-24 PROCEDURE — G8598 ASA/ANTIPLAT THER USED: HCPCS | Performed by: FAMILY MEDICINE

## 2018-10-24 PROCEDURE — G0008 ADMIN INFLUENZA VIRUS VAC: HCPCS | Performed by: FAMILY MEDICINE

## 2018-10-24 PROCEDURE — G8510 SCR DEP NEG, NO PLAN REQD: HCPCS | Performed by: FAMILY MEDICINE

## 2018-10-24 PROCEDURE — 1090F PRES/ABSN URINE INCON ASSESS: CPT | Performed by: FAMILY MEDICINE

## 2018-10-24 PROCEDURE — G8420 CALC BMI NORM PARAMETERS: HCPCS | Performed by: FAMILY MEDICINE

## 2018-10-24 PROCEDURE — 1123F ACP DISCUSS/DSCN MKR DOCD: CPT | Performed by: FAMILY MEDICINE

## 2018-10-24 PROCEDURE — 1036F TOBACCO NON-USER: CPT | Performed by: FAMILY MEDICINE

## 2018-10-24 PROCEDURE — G8482 FLU IMMUNIZE ORDER/ADMIN: HCPCS | Performed by: FAMILY MEDICINE

## 2018-10-24 PROCEDURE — 1101F PT FALLS ASSESS-DOCD LE1/YR: CPT | Performed by: FAMILY MEDICINE

## 2018-10-24 PROCEDURE — 99214 OFFICE O/P EST MOD 30 MIN: CPT | Performed by: FAMILY MEDICINE

## 2018-10-24 PROCEDURE — 4040F PNEUMOC VAC/ADMIN/RCVD: CPT | Performed by: FAMILY MEDICINE

## 2018-10-24 RX ORDER — LISINOPRIL 10 MG/1
10 TABLET ORAL DAILY
Qty: 90 TABLET | Refills: 1 | Status: SHIPPED | OUTPATIENT
Start: 2018-10-24 | End: 2019-01-25 | Stop reason: SDUPTHER

## 2018-10-24 RX ORDER — PHENYTOIN SODIUM 100 MG/1
CAPSULE, EXTENDED RELEASE ORAL
Qty: 180 CAPSULE | Refills: 0 | Status: SHIPPED | OUTPATIENT
Start: 2018-10-24 | End: 2018-12-12 | Stop reason: SDUPTHER

## 2018-10-24 RX ORDER — AMLODIPINE BESYLATE 5 MG/1
5 TABLET ORAL DAILY
Qty: 90 TABLET | Refills: 1 | Status: SHIPPED | OUTPATIENT
Start: 2018-10-24 | End: 2019-01-25 | Stop reason: SDUPTHER

## 2018-10-24 RX ORDER — CLOPIDOGREL BISULFATE 75 MG/1
TABLET ORAL
Qty: 90 TABLET | Refills: 1 | Status: SHIPPED | OUTPATIENT
Start: 2018-10-24 | End: 2019-01-25 | Stop reason: SDUPTHER

## 2018-10-24 RX ORDER — LEVOTHYROXINE SODIUM 0.12 MG/1
TABLET ORAL
Qty: 90 TABLET | Refills: 1 | Status: SHIPPED | OUTPATIENT
Start: 2018-10-24 | End: 2019-01-25 | Stop reason: SDUPTHER

## 2018-10-24 ASSESSMENT — PATIENT HEALTH QUESTIONNAIRE - PHQ9
2. FEELING DOWN, DEPRESSED OR HOPELESS: 0
SUM OF ALL RESPONSES TO PHQ9 QUESTIONS 1 & 2: 0
1. LITTLE INTEREST OR PLEASURE IN DOING THINGS: 0
SUM OF ALL RESPONSES TO PHQ QUESTIONS 1-9: 0
SUM OF ALL RESPONSES TO PHQ QUESTIONS 1-9: 0

## 2018-10-24 NOTE — PROGRESS NOTES
Subjective:      Patient ID: Vera Villafuerte is a 80 y.o. female. CC: Patient presents for re-evaluation of chronic health problems including Status post hip fracture, seizure disorder, dementia with behavioral disturbances, altered mental status hospitalization, recurrent falls. HPI Pt is here for a follow up in accompaniment of daughter. Patient was hospitalized July 6 with closed hip fracture of the right side. She was then readmitted to the hospital July 14 with altered mental status. Per orthopedic note patient is doing well in regards to hip fracture. Patient's daughter feels she still weak and not participating is much with the exercise. Patient does live in a trilevel home and had fallen as she was trying to do things without her walker. Age is now been very cautious using her walker all the time. Family helps her up and down the steps always. She is still hearing some voices and having nighttime problems. But her symptoms have significantly improved with the seroquel medication. patient denies any issues with bowel or bladder and her eating seems to be stable with weight being constant.     Review of Systems  Patient Active Problem List   Diagnosis    Depressive disorder, not elsewhere classified    Essential hypertension    Hypothyroidism    Osteoarthritis    Chronic fatigue syndrome    Migraine    Peripheral neuropathy    Cerebral artery occlusion with cerebral infarction (HCC)    Seizures (HCC)    Parkinson disease (HCC)    Hearing disorder, sensorineural    Tremor    Frequent falls    Weakness generalized    Traumatic arthritis elbow    Dementia with behavioral disturbance    Hemorrhoids    Altered mental state    Displaced intertrochanteric fracture of right femur, initial encounter for closed fracture [facility])       Outpatient Prescriptions Marked as Taking for the 10/24/18 encounter (Office Visit) with Monet Sevilla MD   Medication Sig Dispense Refill    QUEtiapine (SEROQUEL) 50 MG tablet Take 1 tablet by mouth nightly 90 tablet 0    gabapentin (NEURONTIN) 300 MG capsule TAKE 1 CAPSULE BY MOUTH  NIGHTLY 90 capsule 1    phenytoin (DILANTIN) 100 MG ER capsule TAKE 2 CAPSULES EVERY  EVENING AT BEDTIME 180 capsule 0    amitriptyline (ELAVIL) 25 MG tablet Take 1 tablet by mouth nightly 90 tablet 1    cyanocobalamin 1000 MCG/ML injection Inject 1 mL into the muscle every 30 days 3 mL 1    docusate sodium (COLACE) 100 MG capsule TAKE 1 CAPSULE BY MOUTH DAILY.  90 capsule 1    clopidogrel (PLAVIX) 75 MG tablet TAKE 1 TABLET BY MOUTH  EVERY DAY 90 tablet 1    amLODIPine (NORVASC) 5 MG tablet TAKE 1 TABLET BY MOUTH  DAILY 90 tablet 1    lisinopril (PRINIVIL;ZESTRIL) 10 MG tablet TAKE 1 TABLET BY MOUTH  DAILY 90 tablet 1    levothyroxine (SYNTHROID) 125 MCG tablet TAKE 1 TABLET BY MOUTH  EVERY DAY 90 tablet 1    Pramox-PE-Glycerin-Petrolatum (PREPARATION H) 1-0.25-14.4-15 % CREA rectal cream Place rectally 2 times daily      ondansetron (ZOFRAN) 4 MG tablet TAKE 1 TABLET BY MOUTH DAILY AS NEEDED FOR NAUSEA OR VOMITING 30 tablet 0    Dextromethorphan-Guaifenesin (MUCINEX DM)  MG TB12 Take by mouth nightly         Allergies   Allergen Reactions    Aspirin Other (See Comments)     unknown    Butalbital-Aspirin-Caffeine Other (See Comments)     unknown    Daypro [Oxaprozin] Other (See Comments)     unknown    Diclofenac Sodium Other (See Comments)     unknown    Erythromycin Swelling    Naprosyn [Naproxen]     Penicillins      Pt denies allergy to pcn    Prinivil [Lisinopril] Other (See Comments)     Cough     Sulfa Antibiotics Swelling       Social History   Substance Use Topics    Smoking status: Never Smoker    Smokeless tobacco: Never Used    Alcohol use No       /70 (Site: Left Upper Arm, Position: Sitting, Cuff Size: Medium Adult)   Pulse 89   Resp 12   Wt 142 lb (64.4 kg)   SpO2 90%   BMI 24.37 kg/m²     Objective:   Physical Exam MOUTH  EVERY DAY  -     levothyroxine (SYNTHROID) 125 MCG tablet; TAKE 1 TABLET BY MOUTH  EVERY DAY  -     INFLUENZA, HIGH DOSE, 65 YRS +, IM, PF, PREFILL SYR, 0.5ML (FLUZONE HD)    OARRS report checked          Plan:      Hospital information reviewed with patient and daughter  Recommended maintaining current medical management  Did discuss stopping amitriptyline medication the patient would prefer to maintain this medication  Discussed using a walker all the time and discussed needing help when patient is up and down steps as she does live in a trilevel withher family. Depression seems to be well controlled and we'll maintain current medical management  RTC 4 months    Please note that this chart was generated using Dragon dictation software. Although every effort was made to ensure the accuracy of this automated transcription, some errors in transcription may have occurred.             Priti Gr

## 2018-11-05 RX ORDER — DOCUSATE SODIUM 100 MG/1
CAPSULE, LIQUID FILLED ORAL
Qty: 90 CAPSULE | Refills: 1 | Status: SHIPPED | OUTPATIENT
Start: 2018-11-05 | End: 2019-05-05 | Stop reason: SDUPTHER

## 2018-11-26 RX ORDER — PHENYTOIN SODIUM 100 MG/1
CAPSULE, EXTENDED RELEASE ORAL
Qty: 180 CAPSULE | Refills: 0 | OUTPATIENT
Start: 2018-11-26 | End: 2019-02-24

## 2018-12-12 RX ORDER — PHENYTOIN SODIUM 100 MG/1
CAPSULE, EXTENDED RELEASE ORAL
Qty: 180 CAPSULE | Refills: 1 | Status: SHIPPED | OUTPATIENT
Start: 2018-12-12 | End: 2019-06-20 | Stop reason: SDUPTHER

## 2018-12-17 RX ORDER — AMITRIPTYLINE HYDROCHLORIDE 25 MG/1
TABLET, FILM COATED ORAL
Qty: 90 TABLET | Refills: 1 | Status: SHIPPED | OUTPATIENT
Start: 2018-12-17 | End: 2019-03-20 | Stop reason: SDUPTHER

## 2018-12-17 RX ORDER — CYANOCOBALAMIN 1000 UG/ML
1000 INJECTION INTRAMUSCULAR; SUBCUTANEOUS
Qty: 3 ML | Refills: 1 | Status: SHIPPED | OUTPATIENT
Start: 2018-12-17 | End: 2019-03-20 | Stop reason: SDUPTHER

## 2019-01-14 RX ORDER — GABAPENTIN 300 MG/1
CAPSULE ORAL
Qty: 90 CAPSULE | Refills: 0 | Status: SHIPPED | OUTPATIENT
Start: 2019-01-14 | End: 2019-03-18 | Stop reason: SDUPTHER

## 2019-01-15 ENCOUNTER — OFFICE VISIT (OUTPATIENT)
Dept: ORTHOPEDIC SURGERY | Age: 84
End: 2019-01-15
Payer: MEDICARE

## 2019-01-15 VITALS
WEIGHT: 142 LBS | BODY MASS INDEX: 24.24 KG/M2 | HEIGHT: 64 IN | DIASTOLIC BLOOD PRESSURE: 75 MMHG | SYSTOLIC BLOOD PRESSURE: 136 MMHG | RESPIRATION RATE: 16 BRPM | HEART RATE: 75 BPM

## 2019-01-15 DIAGNOSIS — S72.141A DISPLACED INTERTROCHANTERIC FRACTURE OF RIGHT FEMUR, INITIAL ENCOUNTER FOR CLOSED FRACTURE (HCC): Primary | ICD-10-CM

## 2019-01-15 PROCEDURE — G8598 ASA/ANTIPLAT THER USED: HCPCS | Performed by: ORTHOPAEDIC SURGERY

## 2019-01-15 PROCEDURE — 1101F PT FALLS ASSESS-DOCD LE1/YR: CPT | Performed by: ORTHOPAEDIC SURGERY

## 2019-01-15 PROCEDURE — 1090F PRES/ABSN URINE INCON ASSESS: CPT | Performed by: ORTHOPAEDIC SURGERY

## 2019-01-15 PROCEDURE — 4040F PNEUMOC VAC/ADMIN/RCVD: CPT | Performed by: ORTHOPAEDIC SURGERY

## 2019-01-15 PROCEDURE — G8420 CALC BMI NORM PARAMETERS: HCPCS | Performed by: ORTHOPAEDIC SURGERY

## 2019-01-15 PROCEDURE — G8482 FLU IMMUNIZE ORDER/ADMIN: HCPCS | Performed by: ORTHOPAEDIC SURGERY

## 2019-01-15 PROCEDURE — 1123F ACP DISCUSS/DSCN MKR DOCD: CPT | Performed by: ORTHOPAEDIC SURGERY

## 2019-01-15 PROCEDURE — 99213 OFFICE O/P EST LOW 20 MIN: CPT | Performed by: ORTHOPAEDIC SURGERY

## 2019-01-15 PROCEDURE — 1036F TOBACCO NON-USER: CPT | Performed by: ORTHOPAEDIC SURGERY

## 2019-01-15 PROCEDURE — G8427 DOCREV CUR MEDS BY ELIG CLIN: HCPCS | Performed by: ORTHOPAEDIC SURGERY

## 2019-01-25 DIAGNOSIS — I10 ESSENTIAL HYPERTENSION: ICD-10-CM

## 2019-01-25 RX ORDER — LEVOTHYROXINE SODIUM 0.12 MG/1
TABLET ORAL
Qty: 90 TABLET | Refills: 1 | Status: SHIPPED | OUTPATIENT
Start: 2019-01-25 | End: 2019-09-06 | Stop reason: SDUPTHER

## 2019-01-25 RX ORDER — CLOPIDOGREL BISULFATE 75 MG/1
TABLET ORAL
Qty: 90 TABLET | Refills: 1 | Status: SHIPPED | OUTPATIENT
Start: 2019-01-25 | End: 2019-09-06 | Stop reason: SDUPTHER

## 2019-01-25 RX ORDER — LISINOPRIL 10 MG/1
10 TABLET ORAL DAILY
Qty: 90 TABLET | Refills: 1 | Status: SHIPPED | OUTPATIENT
Start: 2019-01-25 | End: 2019-09-06 | Stop reason: SDUPTHER

## 2019-01-25 RX ORDER — AMLODIPINE BESYLATE 5 MG/1
5 TABLET ORAL DAILY
Qty: 90 TABLET | Refills: 1 | Status: SHIPPED | OUTPATIENT
Start: 2019-01-25 | End: 2019-04-19 | Stop reason: ALTCHOICE

## 2019-01-28 RX ORDER — QUETIAPINE FUMARATE 50 MG/1
50 TABLET, FILM COATED ORAL NIGHTLY
Qty: 90 TABLET | Refills: 0 | Status: SHIPPED | OUTPATIENT
Start: 2019-01-28 | End: 2019-02-15 | Stop reason: DRUGHIGH

## 2019-02-15 ENCOUNTER — OFFICE VISIT (OUTPATIENT)
Dept: FAMILY MEDICINE CLINIC | Age: 84
End: 2019-02-15
Payer: MEDICARE

## 2019-02-15 VITALS
DIASTOLIC BLOOD PRESSURE: 70 MMHG | TEMPERATURE: 99.2 F | OXYGEN SATURATION: 93 % | SYSTOLIC BLOOD PRESSURE: 120 MMHG | HEART RATE: 81 BPM

## 2019-02-15 DIAGNOSIS — G31.83 LEWY BODY DEMENTIA WITH BEHAVIORAL DISTURBANCE (HCC): ICD-10-CM

## 2019-02-15 DIAGNOSIS — F02.818 LEWY BODY DEMENTIA WITH BEHAVIORAL DISTURBANCE (HCC): ICD-10-CM

## 2019-02-15 DIAGNOSIS — J20.9 ACUTE BRONCHITIS, UNSPECIFIED ORGANISM: Primary | ICD-10-CM

## 2019-02-15 PROCEDURE — 99213 OFFICE O/P EST LOW 20 MIN: CPT | Performed by: FAMILY MEDICINE

## 2019-02-15 PROCEDURE — 4040F PNEUMOC VAC/ADMIN/RCVD: CPT | Performed by: FAMILY MEDICINE

## 2019-02-15 PROCEDURE — 1123F ACP DISCUSS/DSCN MKR DOCD: CPT | Performed by: FAMILY MEDICINE

## 2019-02-15 PROCEDURE — G8428 CUR MEDS NOT DOCUMENT: HCPCS | Performed by: FAMILY MEDICINE

## 2019-02-15 PROCEDURE — 1090F PRES/ABSN URINE INCON ASSESS: CPT | Performed by: FAMILY MEDICINE

## 2019-02-15 PROCEDURE — G8598 ASA/ANTIPLAT THER USED: HCPCS | Performed by: FAMILY MEDICINE

## 2019-02-15 PROCEDURE — 1101F PT FALLS ASSESS-DOCD LE1/YR: CPT | Performed by: FAMILY MEDICINE

## 2019-02-15 PROCEDURE — 1036F TOBACCO NON-USER: CPT | Performed by: FAMILY MEDICINE

## 2019-02-15 PROCEDURE — G8420 CALC BMI NORM PARAMETERS: HCPCS | Performed by: FAMILY MEDICINE

## 2019-02-15 PROCEDURE — G8482 FLU IMMUNIZE ORDER/ADMIN: HCPCS | Performed by: FAMILY MEDICINE

## 2019-02-15 RX ORDER — CEPHALEXIN 500 MG/1
500 CAPSULE ORAL 3 TIMES DAILY
Qty: 30 CAPSULE | Refills: 0 | Status: SHIPPED | OUTPATIENT
Start: 2019-02-15 | End: 2019-02-25

## 2019-02-15 RX ORDER — QUETIAPINE FUMARATE 50 MG/1
100 TABLET, FILM COATED ORAL NIGHTLY
Qty: 90 TABLET | Refills: 0 | Status: SHIPPED | OUTPATIENT
Start: 2019-02-15 | End: 2019-02-27 | Stop reason: SDUPTHER

## 2019-02-15 RX ORDER — IBUPROFEN 800 MG/1
800 TABLET ORAL
Qty: 90 TABLET | Refills: 0 | Status: SHIPPED | OUTPATIENT
Start: 2019-02-15 | End: 2019-03-19 | Stop reason: SDUPTHER

## 2019-02-15 ASSESSMENT — ENCOUNTER SYMPTOMS: COUGH: 1

## 2019-02-25 ENCOUNTER — OFFICE VISIT (OUTPATIENT)
Dept: FAMILY MEDICINE CLINIC | Age: 84
End: 2019-02-25
Payer: MEDICARE

## 2019-02-25 VITALS
HEART RATE: 77 BPM | BODY MASS INDEX: 23.52 KG/M2 | OXYGEN SATURATION: 97 % | SYSTOLIC BLOOD PRESSURE: 110 MMHG | DIASTOLIC BLOOD PRESSURE: 52 MMHG | WEIGHT: 137 LBS

## 2019-02-25 DIAGNOSIS — R53.1 WEAKNESS GENERALIZED: ICD-10-CM

## 2019-02-25 DIAGNOSIS — F32.5 MAJOR DEPRESSIVE DISORDER WITH SINGLE EPISODE, IN FULL REMISSION (HCC): ICD-10-CM

## 2019-02-25 DIAGNOSIS — F02.818 LEWY BODY DEMENTIA WITH BEHAVIORAL DISTURBANCE (HCC): Primary | ICD-10-CM

## 2019-02-25 DIAGNOSIS — G31.83 LEWY BODY DEMENTIA WITH BEHAVIORAL DISTURBANCE (HCC): Primary | ICD-10-CM

## 2019-02-25 DIAGNOSIS — R56.9 SEIZURES (HCC): ICD-10-CM

## 2019-02-25 DIAGNOSIS — E03.9 HYPOTHYROIDISM, UNSPECIFIED TYPE: ICD-10-CM

## 2019-02-25 PROCEDURE — G8420 CALC BMI NORM PARAMETERS: HCPCS | Performed by: FAMILY MEDICINE

## 2019-02-25 PROCEDURE — G8598 ASA/ANTIPLAT THER USED: HCPCS | Performed by: FAMILY MEDICINE

## 2019-02-25 PROCEDURE — 1123F ACP DISCUSS/DSCN MKR DOCD: CPT | Performed by: FAMILY MEDICINE

## 2019-02-25 PROCEDURE — 1090F PRES/ABSN URINE INCON ASSESS: CPT | Performed by: FAMILY MEDICINE

## 2019-02-25 PROCEDURE — 4040F PNEUMOC VAC/ADMIN/RCVD: CPT | Performed by: FAMILY MEDICINE

## 2019-02-25 PROCEDURE — 1101F PT FALLS ASSESS-DOCD LE1/YR: CPT | Performed by: FAMILY MEDICINE

## 2019-02-25 PROCEDURE — 99214 OFFICE O/P EST MOD 30 MIN: CPT | Performed by: FAMILY MEDICINE

## 2019-02-25 PROCEDURE — 1036F TOBACCO NON-USER: CPT | Performed by: FAMILY MEDICINE

## 2019-02-25 PROCEDURE — G8482 FLU IMMUNIZE ORDER/ADMIN: HCPCS | Performed by: FAMILY MEDICINE

## 2019-02-25 PROCEDURE — G8427 DOCREV CUR MEDS BY ELIG CLIN: HCPCS | Performed by: FAMILY MEDICINE

## 2019-02-27 RX ORDER — QUETIAPINE FUMARATE 50 MG/1
150 TABLET, FILM COATED ORAL NIGHTLY
Qty: 90 TABLET | Refills: 0
Start: 2019-02-27 | End: 2019-03-06 | Stop reason: SDUPTHER

## 2019-03-06 RX ORDER — QUETIAPINE FUMARATE 50 MG/1
150 TABLET, FILM COATED ORAL NIGHTLY
Qty: 270 TABLET | Refills: 1 | Status: SHIPPED | OUTPATIENT
Start: 2019-03-06 | End: 2019-04-19 | Stop reason: DRUGHIGH

## 2019-03-19 RX ORDER — GABAPENTIN 300 MG/1
CAPSULE ORAL
Qty: 90 CAPSULE | Refills: 0 | Status: SHIPPED | OUTPATIENT
Start: 2019-03-19 | End: 2019-06-20 | Stop reason: SDUPTHER

## 2019-03-19 RX ORDER — IBUPROFEN 800 MG/1
800 TABLET ORAL
Qty: 270 TABLET | Refills: 0 | Status: SHIPPED | OUTPATIENT
Start: 2019-03-19 | End: 2019-04-19

## 2019-03-21 RX ORDER — CYANOCOBALAMIN 1000 UG/ML
1000 INJECTION INTRAMUSCULAR; SUBCUTANEOUS
Qty: 3 ML | Refills: 1 | Status: SHIPPED | OUTPATIENT
Start: 2019-03-21 | End: 2019-12-12 | Stop reason: SDUPTHER

## 2019-03-21 RX ORDER — AMITRIPTYLINE HYDROCHLORIDE 25 MG/1
TABLET, FILM COATED ORAL
Qty: 90 TABLET | Refills: 1 | Status: SHIPPED | OUTPATIENT
Start: 2019-03-21 | End: 2019-04-19

## 2019-03-28 ENCOUNTER — TELEPHONE (OUTPATIENT)
Dept: FAMILY MEDICINE CLINIC | Age: 84
End: 2019-03-28

## 2019-03-28 DIAGNOSIS — F03.91 DEMENTIA WITH BEHAVIORAL DISTURBANCE, UNSPECIFIED DEMENTIA TYPE: Primary | ICD-10-CM

## 2019-03-28 DIAGNOSIS — I63.50 CEREBRAL ARTERY OCCLUSION WITH CEREBRAL INFARCTION (HCC): ICD-10-CM

## 2019-03-28 DIAGNOSIS — G20 PARKINSON DISEASE (HCC): ICD-10-CM

## 2019-04-19 ENCOUNTER — OFFICE VISIT (OUTPATIENT)
Dept: FAMILY MEDICINE CLINIC | Age: 84
End: 2019-04-19
Payer: MEDICARE

## 2019-04-19 VITALS
TEMPERATURE: 97.9 F | HEART RATE: 78 BPM | SYSTOLIC BLOOD PRESSURE: 96 MMHG | OXYGEN SATURATION: 95 % | DIASTOLIC BLOOD PRESSURE: 52 MMHG

## 2019-04-19 DIAGNOSIS — R41.0 CONFUSION: Primary | ICD-10-CM

## 2019-04-19 DIAGNOSIS — K29.00 ACUTE GASTRITIS, PRESENCE OF BLEEDING UNSPECIFIED, UNSPECIFIED GASTRITIS TYPE: ICD-10-CM

## 2019-04-19 DIAGNOSIS — R60.0 LOCALIZED EDEMA: ICD-10-CM

## 2019-04-19 DIAGNOSIS — R31.9 URINARY TRACT INFECTION WITH HEMATURIA, SITE UNSPECIFIED: ICD-10-CM

## 2019-04-19 DIAGNOSIS — N39.0 URINARY TRACT INFECTION WITH HEMATURIA, SITE UNSPECIFIED: ICD-10-CM

## 2019-04-19 DIAGNOSIS — G31.83 LEWY BODY DEMENTIA WITH BEHAVIORAL DISTURBANCE (HCC): ICD-10-CM

## 2019-04-19 DIAGNOSIS — F02.818 LEWY BODY DEMENTIA WITH BEHAVIORAL DISTURBANCE (HCC): ICD-10-CM

## 2019-04-19 LAB
BACTERIA URINE, POC: ABNORMAL
BILIRUBIN URINE: 0 MG/DL
BLOOD, URINE: POSITIVE
CASTS URINE, POC: ABNORMAL
CLARITY: ABNORMAL
COLOR: YELLOW
CRYSTALS URINE, POC: ABNORMAL
EPI CELLS URINE, POC: ABNORMAL
GLUCOSE URINE: NEGATIVE
KETONES, URINE: NEGATIVE
LEUKOCYTE EST, POC: ABNORMAL
NITRITE, URINE: NEGATIVE
PH UA: 6.5 (ref 4.5–8)
PROTEIN UA: POSITIVE
RBC URINE, POC: ABNORMAL
SPECIFIC GRAVITY UA: 1.02 (ref 1–1.03)
UROBILINOGEN, URINE: NORMAL
WBC URINE, POC: ABNORMAL
YEAST URINE, POC: ABNORMAL

## 2019-04-19 PROCEDURE — G8598 ASA/ANTIPLAT THER USED: HCPCS | Performed by: FAMILY MEDICINE

## 2019-04-19 PROCEDURE — 1036F TOBACCO NON-USER: CPT | Performed by: FAMILY MEDICINE

## 2019-04-19 PROCEDURE — G8420 CALC BMI NORM PARAMETERS: HCPCS | Performed by: FAMILY MEDICINE

## 2019-04-19 PROCEDURE — 1090F PRES/ABSN URINE INCON ASSESS: CPT | Performed by: FAMILY MEDICINE

## 2019-04-19 PROCEDURE — G8428 CUR MEDS NOT DOCUMENT: HCPCS | Performed by: FAMILY MEDICINE

## 2019-04-19 PROCEDURE — 1123F ACP DISCUSS/DSCN MKR DOCD: CPT | Performed by: FAMILY MEDICINE

## 2019-04-19 PROCEDURE — 81000 URINALYSIS NONAUTO W/SCOPE: CPT | Performed by: FAMILY MEDICINE

## 2019-04-19 PROCEDURE — 4040F PNEUMOC VAC/ADMIN/RCVD: CPT | Performed by: FAMILY MEDICINE

## 2019-04-19 PROCEDURE — 99214 OFFICE O/P EST MOD 30 MIN: CPT | Performed by: FAMILY MEDICINE

## 2019-04-19 RX ORDER — QUETIAPINE FUMARATE 50 MG/1
100 TABLET, FILM COATED ORAL NIGHTLY
Qty: 270 TABLET | Refills: 1 | Status: SHIPPED | OUTPATIENT
Start: 2019-04-19 | End: 2019-05-02 | Stop reason: ALTCHOICE

## 2019-04-19 NOTE — PROGRESS NOTES
Subjective:      Patient ID: Génesis Caldwell is a 80 y.o. female. CC: Patient presents for acute medical problem-increasing confusion, abdominal pain and left leg edema . Medical assistant notes reviewed. HPI Patient presents with fatigue and not feeling well in accompaniment of 2 daughters. Patient is usually confused so they are unable to tell if her confusion has changed. Patient is having some upper abdominal pain for the last several weeks and the one daughter thought was related ibuprofen had mumps stop the medication. Since that time her GI symptoms seem to have improved. She is back eating okay. The other as they were unable to keep the cervical at 150 mg nightly because she states became more agitated. They did not discontinuing amitriptyline medication. She is also complaining of left leg edema that worsens throughout the day. She denies any significant discomfort in her leg. She has had some more falls    Review of Systems     Allergies   Allergen Reactions    Aspirin Other (See Comments)     unknown    Butalbital-Aspirin-Caffeine Other (See Comments)     unknown    Daypro [Oxaprozin] Other (See Comments)     unknown    Diclofenac Sodium Other (See Comments)     unknown    Erythromycin Swelling    Naprosyn [Naproxen]     Penicillins      Pt denies allergy to pcn    Prinivil [Lisinopril] Other (See Comments)     Cough     Sulfa Antibiotics Swelling       Objective:   Physical Exam   Constitutional: She appears well-developed and well-nourished. She is cooperative. HENT:   Mouth/Throat: Oropharynx is clear and moist.   Neck: Carotid bruit is not present. No thyromegaly present. Cardiovascular: Normal rate, regular rhythm and normal heart sounds. No murmur heard. Pulses:       Dorsalis pedis pulses are 2+ on the right side, and 2+ on the left side. Posterior tibial pulses are 2+ on the right side, and 2+ on the left side.    Pulmonary/Chest: Effort normal and breath sounds

## 2019-04-21 LAB
ORGANISM: ABNORMAL
URINE CULTURE, ROUTINE: ABNORMAL
URINE CULTURE, ROUTINE: ABNORMAL

## 2019-04-22 RX ORDER — NITROFURANTOIN 25; 75 MG/1; MG/1
100 CAPSULE ORAL 2 TIMES DAILY
Qty: 10 CAPSULE | Refills: 0 | Status: SHIPPED | OUTPATIENT
Start: 2019-04-22 | End: 2019-04-27

## 2019-05-02 RX ORDER — QUETIAPINE FUMARATE 50 MG/1
100 TABLET, FILM COATED ORAL DAILY
Qty: 180 TABLET | Refills: 1 | OUTPATIENT
Start: 2019-05-02

## 2019-05-02 RX ORDER — QUETIAPINE FUMARATE 100 MG/1
100 TABLET, FILM COATED ORAL DAILY
Qty: 90 TABLET | Refills: 1 | Status: SHIPPED | OUTPATIENT
Start: 2019-05-02 | End: 2019-08-09

## 2019-05-06 RX ORDER — DOCUSATE SODIUM 100 MG/1
CAPSULE, LIQUID FILLED ORAL
Qty: 90 CAPSULE | Refills: 0 | Status: SHIPPED | OUTPATIENT
Start: 2019-05-06 | End: 2019-08-13 | Stop reason: SDUPTHER

## 2019-06-20 RX ORDER — GABAPENTIN 300 MG/1
CAPSULE ORAL
Qty: 90 CAPSULE | Refills: 0 | Status: SHIPPED | OUTPATIENT
Start: 2019-06-20 | End: 2019-08-09

## 2019-06-20 RX ORDER — PHENYTOIN SODIUM 100 MG/1
CAPSULE, EXTENDED RELEASE ORAL
Qty: 180 CAPSULE | Refills: 0 | Status: SHIPPED | OUTPATIENT
Start: 2019-06-20 | End: 2019-09-06 | Stop reason: SDUPTHER

## 2019-08-09 ENCOUNTER — OFFICE VISIT (OUTPATIENT)
Dept: FAMILY MEDICINE CLINIC | Age: 84
End: 2019-08-09
Payer: MEDICARE

## 2019-08-09 VITALS — OXYGEN SATURATION: 97 % | DIASTOLIC BLOOD PRESSURE: 70 MMHG | HEART RATE: 73 BPM | SYSTOLIC BLOOD PRESSURE: 104 MMHG

## 2019-08-09 DIAGNOSIS — G60.9 IDIOPATHIC PERIPHERAL NEUROPATHY: Primary | ICD-10-CM

## 2019-08-09 DIAGNOSIS — G31.83 LEWY BODY DEMENTIA WITH BEHAVIORAL DISTURBANCE (HCC): ICD-10-CM

## 2019-08-09 DIAGNOSIS — G20 PARKINSON DISEASE (HCC): ICD-10-CM

## 2019-08-09 DIAGNOSIS — R53.1 WEAKNESS GENERALIZED: ICD-10-CM

## 2019-08-09 DIAGNOSIS — M19.91 PRIMARY OSTEOARTHRITIS, UNSPECIFIED SITE: ICD-10-CM

## 2019-08-09 DIAGNOSIS — F02.818 LEWY BODY DEMENTIA WITH BEHAVIORAL DISTURBANCE (HCC): ICD-10-CM

## 2019-08-09 PROCEDURE — 4040F PNEUMOC VAC/ADMIN/RCVD: CPT | Performed by: FAMILY MEDICINE

## 2019-08-09 PROCEDURE — G8427 DOCREV CUR MEDS BY ELIG CLIN: HCPCS | Performed by: FAMILY MEDICINE

## 2019-08-09 PROCEDURE — 1123F ACP DISCUSS/DSCN MKR DOCD: CPT | Performed by: FAMILY MEDICINE

## 2019-08-09 PROCEDURE — G8598 ASA/ANTIPLAT THER USED: HCPCS | Performed by: FAMILY MEDICINE

## 2019-08-09 PROCEDURE — 1036F TOBACCO NON-USER: CPT | Performed by: FAMILY MEDICINE

## 2019-08-09 PROCEDURE — 1090F PRES/ABSN URINE INCON ASSESS: CPT | Performed by: FAMILY MEDICINE

## 2019-08-09 PROCEDURE — 99214 OFFICE O/P EST MOD 30 MIN: CPT | Performed by: FAMILY MEDICINE

## 2019-08-09 PROCEDURE — G8420 CALC BMI NORM PARAMETERS: HCPCS | Performed by: FAMILY MEDICINE

## 2019-08-09 RX ORDER — QUETIAPINE FUMARATE 100 MG/1
200 TABLET, FILM COATED ORAL NIGHTLY
COMMUNITY
End: 2019-10-21 | Stop reason: SDUPTHER

## 2019-08-09 RX ORDER — GABAPENTIN 400 MG/1
400 CAPSULE ORAL NIGHTLY
Qty: 90 CAPSULE | Refills: 1 | Status: SHIPPED | OUTPATIENT
Start: 2019-08-09 | End: 2019-10-09 | Stop reason: SDUPTHER

## 2019-08-13 RX ORDER — DOCUSATE SODIUM 100 MG/1
CAPSULE, LIQUID FILLED ORAL
Qty: 90 CAPSULE | Refills: 1 | Status: SHIPPED | OUTPATIENT
Start: 2019-08-13

## 2019-08-21 ENCOUNTER — TELEPHONE (OUTPATIENT)
Dept: FAMILY MEDICINE CLINIC | Age: 84
End: 2019-08-21

## 2019-09-06 DIAGNOSIS — I10 ESSENTIAL HYPERTENSION: ICD-10-CM

## 2019-09-06 RX ORDER — LEVOTHYROXINE SODIUM 0.12 MG/1
TABLET ORAL
Qty: 90 TABLET | Refills: 1 | Status: SHIPPED | OUTPATIENT
Start: 2019-09-06 | End: 2020-05-26

## 2019-09-06 RX ORDER — CLOPIDOGREL BISULFATE 75 MG/1
TABLET ORAL
Qty: 90 TABLET | Refills: 1 | Status: SHIPPED | OUTPATIENT
Start: 2019-09-06 | End: 2020-05-26

## 2019-09-06 RX ORDER — LISINOPRIL 10 MG/1
10 TABLET ORAL DAILY
Qty: 90 TABLET | Refills: 1 | Status: ON HOLD | OUTPATIENT
Start: 2019-09-06 | End: 2020-01-17 | Stop reason: HOSPADM

## 2019-09-06 RX ORDER — PHENYTOIN SODIUM 100 MG/1
CAPSULE, EXTENDED RELEASE ORAL
Qty: 180 CAPSULE | Refills: 1 | Status: SHIPPED | OUTPATIENT
Start: 2019-09-06 | End: 2020-05-26

## 2019-10-09 RX ORDER — GABAPENTIN 400 MG/1
400 CAPSULE ORAL 2 TIMES DAILY
Qty: 180 CAPSULE | Refills: 0 | Status: SHIPPED | OUTPATIENT
Start: 2019-10-09 | End: 2019-11-06 | Stop reason: SDUPTHER

## 2019-10-21 RX ORDER — QUETIAPINE FUMARATE 100 MG/1
200 TABLET, FILM COATED ORAL NIGHTLY
Qty: 60 TABLET | Refills: 2 | Status: SHIPPED | OUTPATIENT
Start: 2019-10-21

## 2019-10-30 ENCOUNTER — TELEPHONE (OUTPATIENT)
Dept: FAMILY MEDICINE CLINIC | Age: 84
End: 2019-10-30

## 2019-11-06 RX ORDER — GABAPENTIN 400 MG/1
CAPSULE ORAL
Qty: 180 CAPSULE | Refills: 0 | Status: SHIPPED | OUTPATIENT
Start: 2019-11-06 | End: 2020-02-04

## 2019-11-12 ENCOUNTER — OFFICE VISIT (OUTPATIENT)
Dept: ORTHOPEDIC SURGERY | Age: 84
End: 2019-11-12
Payer: MEDICARE

## 2019-11-12 VITALS — WEIGHT: 135 LBS | BODY MASS INDEX: 21.69 KG/M2 | HEIGHT: 66 IN

## 2019-11-12 DIAGNOSIS — M25.531 RIGHT WRIST PAIN: ICD-10-CM

## 2019-11-12 DIAGNOSIS — S62.501A: ICD-10-CM

## 2019-11-12 DIAGNOSIS — S63.275A CLOSED DISLOCATION OF INTERPHALANGEAL JOINT OF LEFT RING FINGER: ICD-10-CM

## 2019-11-12 DIAGNOSIS — M79.641 RIGHT HAND PAIN: Primary | ICD-10-CM

## 2019-11-12 DIAGNOSIS — M79.642 LEFT HAND PAIN: ICD-10-CM

## 2019-11-12 PROCEDURE — L3908 WHO COCK-UP NONMOLDE PRE OTS: HCPCS | Performed by: NURSE PRACTITIONER

## 2019-11-12 PROCEDURE — 99214 OFFICE O/P EST MOD 30 MIN: CPT | Performed by: NURSE PRACTITIONER

## 2019-11-12 PROCEDURE — G8420 CALC BMI NORM PARAMETERS: HCPCS | Performed by: NURSE PRACTITIONER

## 2019-11-12 PROCEDURE — G8484 FLU IMMUNIZE NO ADMIN: HCPCS | Performed by: NURSE PRACTITIONER

## 2019-11-12 PROCEDURE — G8598 ASA/ANTIPLAT THER USED: HCPCS | Performed by: NURSE PRACTITIONER

## 2019-11-12 PROCEDURE — 4040F PNEUMOC VAC/ADMIN/RCVD: CPT | Performed by: NURSE PRACTITIONER

## 2019-11-12 PROCEDURE — 1123F ACP DISCUSS/DSCN MKR DOCD: CPT | Performed by: NURSE PRACTITIONER

## 2019-11-12 PROCEDURE — G8427 DOCREV CUR MEDS BY ELIG CLIN: HCPCS | Performed by: NURSE PRACTITIONER

## 2019-11-12 PROCEDURE — 1036F TOBACCO NON-USER: CPT | Performed by: NURSE PRACTITIONER

## 2019-11-12 PROCEDURE — 1090F PRES/ABSN URINE INCON ASSESS: CPT | Performed by: NURSE PRACTITIONER

## 2019-11-14 RX ORDER — QUETIAPINE FUMARATE 100 MG/1
TABLET, FILM COATED ORAL
Qty: 60 TABLET | Refills: 2 | OUTPATIENT
Start: 2019-11-14

## 2019-11-18 ENCOUNTER — OFFICE VISIT (OUTPATIENT)
Dept: ORTHOPEDIC SURGERY | Age: 84
End: 2019-11-18
Payer: MEDICARE

## 2019-11-18 VITALS — WEIGHT: 135 LBS | HEIGHT: 66 IN | BODY MASS INDEX: 21.69 KG/M2 | RESPIRATION RATE: 16 BRPM

## 2019-11-18 DIAGNOSIS — S62.521A CLOSED DISPLACED FRACTURE OF DISTAL PHALANX OF RIGHT THUMB, INITIAL ENCOUNTER: Primary | ICD-10-CM

## 2019-11-18 DIAGNOSIS — M20.012 MALLET FINGER OF LEFT HAND: ICD-10-CM

## 2019-11-18 PROCEDURE — 99213 OFFICE O/P EST LOW 20 MIN: CPT | Performed by: ORTHOPAEDIC SURGERY

## 2019-11-18 PROCEDURE — 26432 REPAIR FINGER TENDON: CPT | Performed by: ORTHOPAEDIC SURGERY

## 2019-11-18 PROCEDURE — 26740 TREAT FINGER FRACTURE EACH: CPT | Performed by: ORTHOPAEDIC SURGERY

## 2019-11-18 PROCEDURE — G8420 CALC BMI NORM PARAMETERS: HCPCS | Performed by: ORTHOPAEDIC SURGERY

## 2019-11-18 PROCEDURE — G8428 CUR MEDS NOT DOCUMENT: HCPCS | Performed by: ORTHOPAEDIC SURGERY

## 2019-11-18 PROCEDURE — G8484 FLU IMMUNIZE NO ADMIN: HCPCS | Performed by: ORTHOPAEDIC SURGERY

## 2019-11-18 PROCEDURE — 1090F PRES/ABSN URINE INCON ASSESS: CPT | Performed by: ORTHOPAEDIC SURGERY

## 2019-11-20 RX ORDER — IBUPROFEN 800 MG/1
800 TABLET ORAL
Qty: 270 TABLET | Refills: 0 | Status: SHIPPED | OUTPATIENT
Start: 2019-11-20

## 2019-12-05 ENCOUNTER — OFFICE VISIT (OUTPATIENT)
Dept: FAMILY MEDICINE CLINIC | Age: 84
End: 2019-12-05
Payer: MEDICARE

## 2019-12-05 VITALS — HEART RATE: 71 BPM | OXYGEN SATURATION: 96 % | SYSTOLIC BLOOD PRESSURE: 130 MMHG | DIASTOLIC BLOOD PRESSURE: 70 MMHG

## 2019-12-05 DIAGNOSIS — L28.0 NEURODERMATITIS: ICD-10-CM

## 2019-12-05 DIAGNOSIS — R53.1 WEAKNESS GENERALIZED: ICD-10-CM

## 2019-12-05 DIAGNOSIS — F02.818 LEWY BODY DEMENTIA WITH BEHAVIORAL DISTURBANCE (HCC): Primary | ICD-10-CM

## 2019-12-05 DIAGNOSIS — G31.83 LEWY BODY DEMENTIA WITH BEHAVIORAL DISTURBANCE (HCC): Primary | ICD-10-CM

## 2019-12-05 DIAGNOSIS — Z23 NEED FOR VACCINATION: ICD-10-CM

## 2019-12-05 DIAGNOSIS — G60.9 IDIOPATHIC PERIPHERAL NEUROPATHY: ICD-10-CM

## 2019-12-05 DIAGNOSIS — R29.6 FREQUENT FALLS: ICD-10-CM

## 2019-12-05 DIAGNOSIS — I10 ESSENTIAL HYPERTENSION: ICD-10-CM

## 2019-12-05 PROCEDURE — G8420 CALC BMI NORM PARAMETERS: HCPCS | Performed by: FAMILY MEDICINE

## 2019-12-05 PROCEDURE — G8598 ASA/ANTIPLAT THER USED: HCPCS | Performed by: FAMILY MEDICINE

## 2019-12-05 PROCEDURE — 1036F TOBACCO NON-USER: CPT | Performed by: FAMILY MEDICINE

## 2019-12-05 PROCEDURE — 1090F PRES/ABSN URINE INCON ASSESS: CPT | Performed by: FAMILY MEDICINE

## 2019-12-05 PROCEDURE — G0008 ADMIN INFLUENZA VIRUS VAC: HCPCS | Performed by: FAMILY MEDICINE

## 2019-12-05 PROCEDURE — 99214 OFFICE O/P EST MOD 30 MIN: CPT | Performed by: FAMILY MEDICINE

## 2019-12-05 PROCEDURE — 90653 IIV ADJUVANT VACCINE IM: CPT | Performed by: FAMILY MEDICINE

## 2019-12-05 PROCEDURE — G8427 DOCREV CUR MEDS BY ELIG CLIN: HCPCS | Performed by: FAMILY MEDICINE

## 2019-12-05 PROCEDURE — 4040F PNEUMOC VAC/ADMIN/RCVD: CPT | Performed by: FAMILY MEDICINE

## 2019-12-05 PROCEDURE — 1123F ACP DISCUSS/DSCN MKR DOCD: CPT | Performed by: FAMILY MEDICINE

## 2019-12-05 PROCEDURE — G8482 FLU IMMUNIZE ORDER/ADMIN: HCPCS | Performed by: FAMILY MEDICINE

## 2019-12-12 RX ORDER — CYANOCOBALAMIN 1000 UG/ML
1000 INJECTION INTRAMUSCULAR; SUBCUTANEOUS
Qty: 3 ML | Refills: 1 | Status: SHIPPED | OUTPATIENT
Start: 2019-12-12

## 2020-01-11 ENCOUNTER — HOSPITAL ENCOUNTER (INPATIENT)
Age: 85
LOS: 6 days | Discharge: NURSING, REHAB OR LONG-TERM CARE | DRG: 689 | End: 2020-01-17
Attending: EMERGENCY MEDICINE | Admitting: INTERNAL MEDICINE
Payer: MEDICARE

## 2020-01-11 PROBLEM — N39.0 COMPLICATED UTI (URINARY TRACT INFECTION): Status: ACTIVE | Noted: 2020-01-11

## 2020-01-11 LAB
A/G RATIO: 1.2 (ref 1.1–2.2)
ALBUMIN SERPL-MCNC: 3.8 G/DL (ref 3.4–5)
ALP BLD-CCNC: 191 U/L (ref 40–129)
ALT SERPL-CCNC: 18 U/L (ref 10–40)
ANION GAP SERPL CALCULATED.3IONS-SCNC: 12 MMOL/L (ref 3–16)
ANISOCYTOSIS: ABNORMAL
AST SERPL-CCNC: 31 U/L (ref 15–37)
BACTERIA: ABNORMAL /HPF
BASOPHILS ABSOLUTE: 0.1 K/UL (ref 0–0.2)
BASOPHILS RELATIVE PERCENT: 0.4 %
BILIRUB SERPL-MCNC: 0.8 MG/DL (ref 0–1)
BILIRUBIN URINE: NEGATIVE
BLOOD, URINE: ABNORMAL
BUN BLDV-MCNC: 22 MG/DL (ref 7–20)
CALCIUM SERPL-MCNC: 8.5 MG/DL (ref 8.3–10.6)
CHLORIDE BLD-SCNC: 107 MMOL/L (ref 99–110)
CLARITY: ABNORMAL
CO2: 19 MMOL/L (ref 21–32)
COLOR: YELLOW
CREAT SERPL-MCNC: 1.1 MG/DL (ref 0.6–1.2)
EOSINOPHILS ABSOLUTE: 0.1 K/UL (ref 0–0.6)
EOSINOPHILS RELATIVE PERCENT: 1 %
EPITHELIAL CELLS, UA: 0 /HPF (ref 0–5)
GFR AFRICAN AMERICAN: 56
GFR NON-AFRICAN AMERICAN: 47
GLOBULIN: 3.3 G/DL
GLUCOSE BLD-MCNC: 146 MG/DL (ref 70–99)
GLUCOSE URINE: NEGATIVE MG/DL
HCT VFR BLD CALC: 28.9 % (ref 36–48)
HEMOGLOBIN: 9.5 G/DL (ref 12–16)
HYALINE CASTS: 1 /LPF (ref 0–8)
HYPOCHROMIA: ABNORMAL
KETONES, URINE: NEGATIVE MG/DL
LEUKOCYTE ESTERASE, URINE: ABNORMAL
LIPASE: 34 U/L (ref 13–60)
LYMPHOCYTES ABSOLUTE: 1.2 K/UL (ref 1–5.1)
LYMPHOCYTES RELATIVE PERCENT: 8.3 %
MACROCYTES: ABNORMAL
MCH RBC QN AUTO: 33.9 PG (ref 26–34)
MCHC RBC AUTO-ENTMCNC: 32.7 G/DL (ref 31–36)
MCV RBC AUTO: 103.5 FL (ref 80–100)
MICROSCOPIC EXAMINATION: YES
MONOCYTES ABSOLUTE: 0.8 K/UL (ref 0–1.3)
MONOCYTES RELATIVE PERCENT: 5.5 %
NEUTROPHILS ABSOLUTE: 11.8 K/UL (ref 1.7–7.7)
NEUTROPHILS RELATIVE PERCENT: 84.8 %
NITRITE, URINE: NEGATIVE
OVALOCYTES: ABNORMAL
PDW BLD-RTO: 18.4 % (ref 12.4–15.4)
PH UA: 6 (ref 5–8)
PLATELET # BLD: 282 K/UL (ref 135–450)
PLATELET SLIDE REVIEW: ADEQUATE
PMV BLD AUTO: 8.2 FL (ref 5–10.5)
POTASSIUM SERPL-SCNC: 4.4 MMOL/L (ref 3.5–5.1)
PROTEIN UA: 30 MG/DL
RBC # BLD: 2.79 M/UL (ref 4–5.2)
RBC UA: 4 /HPF (ref 0–4)
SCHISTOCYTES: ABNORMAL
SLIDE REVIEW: ABNORMAL
SODIUM BLD-SCNC: 138 MMOL/L (ref 136–145)
SPECIFIC GRAVITY UA: 1.02 (ref 1–1.03)
TARGET CELLS: ABNORMAL
TOTAL PROTEIN: 7.1 G/DL (ref 6.4–8.2)
URINE REFLEX TO CULTURE: YES
URINE TYPE: ABNORMAL
UROBILINOGEN, URINE: 1 E.U./DL
WBC # BLD: 13.9 K/UL (ref 4–11)
WBC UA: 618 /HPF (ref 0–5)

## 2020-01-11 PROCEDURE — 87086 URINE CULTURE/COLONY COUNT: CPT

## 2020-01-11 PROCEDURE — 83690 ASSAY OF LIPASE: CPT

## 2020-01-11 PROCEDURE — 2580000003 HC RX 258: Performed by: EMERGENCY MEDICINE

## 2020-01-11 PROCEDURE — 81001 URINALYSIS AUTO W/SCOPE: CPT

## 2020-01-11 PROCEDURE — 82607 VITAMIN B-12: CPT

## 2020-01-11 PROCEDURE — 80053 COMPREHEN METABOLIC PANEL: CPT

## 2020-01-11 PROCEDURE — 1200000000 HC SEMI PRIVATE

## 2020-01-11 PROCEDURE — 93005 ELECTROCARDIOGRAM TRACING: CPT | Performed by: EMERGENCY MEDICINE

## 2020-01-11 PROCEDURE — 87186 SC STD MICRODIL/AGAR DIL: CPT

## 2020-01-11 PROCEDURE — 87077 CULTURE AEROBIC IDENTIFY: CPT

## 2020-01-11 PROCEDURE — 83540 ASSAY OF IRON: CPT

## 2020-01-11 PROCEDURE — 99284 EMERGENCY DEPT VISIT MOD MDM: CPT

## 2020-01-11 PROCEDURE — 6360000002 HC RX W HCPCS: Performed by: EMERGENCY MEDICINE

## 2020-01-11 PROCEDURE — 83550 IRON BINDING TEST: CPT

## 2020-01-11 PROCEDURE — 85025 COMPLETE CBC W/AUTO DIFF WBC: CPT

## 2020-01-11 PROCEDURE — 96365 THER/PROPH/DIAG IV INF INIT: CPT

## 2020-01-11 RX ORDER — 0.9 % SODIUM CHLORIDE 0.9 %
1000 INTRAVENOUS SOLUTION INTRAVENOUS ONCE
Status: COMPLETED | OUTPATIENT
Start: 2020-01-11 | End: 2020-01-11

## 2020-01-11 RX ORDER — LEVOFLOXACIN 5 MG/ML
500 INJECTION, SOLUTION INTRAVENOUS EVERY 24 HOURS
Status: DISCONTINUED | OUTPATIENT
Start: 2020-01-11 | End: 2020-01-11

## 2020-01-11 RX ADMIN — SODIUM CHLORIDE 1000 ML: 9 INJECTION, SOLUTION INTRAVENOUS at 22:03

## 2020-01-11 RX ADMIN — LEVOFLOXACIN 500 MG: 5 INJECTION, SOLUTION INTRAVENOUS at 22:03

## 2020-01-12 LAB
EKG ATRIAL RATE: 91 BPM
EKG DIAGNOSIS: NORMAL
EKG P AXIS: 44 DEGREES
EKG P-R INTERVAL: 174 MS
EKG Q-T INTERVAL: 342 MS
EKG QRS DURATION: 86 MS
EKG QTC CALCULATION (BAZETT): 420 MS
EKG R AXIS: 11 DEGREES
EKG T AXIS: -73 DEGREES
EKG VENTRICULAR RATE: 91 BPM
IRON SATURATION: 18 % (ref 15–50)
IRON: 31 UG/DL (ref 37–145)
TOTAL IRON BINDING CAPACITY: 172 UG/DL (ref 260–445)
VITAMIN B-12: 1169 PG/ML (ref 211–911)

## 2020-01-12 PROCEDURE — 87075 CULTR BACTERIA EXCEPT BLOOD: CPT

## 2020-01-12 PROCEDURE — 87205 SMEAR GRAM STAIN: CPT

## 2020-01-12 PROCEDURE — 87186 SC STD MICRODIL/AGAR DIL: CPT

## 2020-01-12 PROCEDURE — 2580000003 HC RX 258: Performed by: INTERNAL MEDICINE

## 2020-01-12 PROCEDURE — 1200000000 HC SEMI PRIVATE

## 2020-01-12 PROCEDURE — 6360000002 HC RX W HCPCS: Performed by: INTERNAL MEDICINE

## 2020-01-12 PROCEDURE — 99221 1ST HOSP IP/OBS SF/LOW 40: CPT | Performed by: OTOLARYNGOLOGY

## 2020-01-12 PROCEDURE — 94760 N-INVAS EAR/PLS OXIMETRY 1: CPT

## 2020-01-12 PROCEDURE — 83036 HEMOGLOBIN GLYCOSYLATED A1C: CPT

## 2020-01-12 PROCEDURE — 93010 ELECTROCARDIOGRAM REPORT: CPT | Performed by: INTERNAL MEDICINE

## 2020-01-12 PROCEDURE — 6370000000 HC RX 637 (ALT 250 FOR IP): Performed by: INTERNAL MEDICINE

## 2020-01-12 PROCEDURE — 87070 CULTURE OTHR SPECIMN AEROBIC: CPT

## 2020-01-12 PROCEDURE — 2500000003 HC RX 250 WO HCPCS: Performed by: INTERNAL MEDICINE

## 2020-01-12 PROCEDURE — 87077 CULTURE AEROBIC IDENTIFY: CPT

## 2020-01-12 PROCEDURE — 6370000000 HC RX 637 (ALT 250 FOR IP): Performed by: OTOLARYNGOLOGY

## 2020-01-12 PROCEDURE — 86403 PARTICLE AGGLUT ANTBDY SCRN: CPT

## 2020-01-12 PROCEDURE — 36415 COLL VENOUS BLD VENIPUNCTURE: CPT

## 2020-01-12 RX ORDER — SODIUM CHLORIDE 0.9 % (FLUSH) 0.9 %
10 SYRINGE (ML) INJECTION EVERY 12 HOURS SCHEDULED
Status: DISCONTINUED | OUTPATIENT
Start: 2020-01-12 | End: 2020-01-17 | Stop reason: HOSPADM

## 2020-01-12 RX ORDER — SODIUM CHLORIDE, SODIUM LACTATE, POTASSIUM CHLORIDE, CALCIUM CHLORIDE 600; 310; 30; 20 MG/100ML; MG/100ML; MG/100ML; MG/100ML
INJECTION, SOLUTION INTRAVENOUS CONTINUOUS
Status: DISCONTINUED | OUTPATIENT
Start: 2020-01-12 | End: 2020-01-14

## 2020-01-12 RX ORDER — ACETAMINOPHEN 325 MG/1
650 TABLET ORAL ONCE
Status: COMPLETED | OUTPATIENT
Start: 2020-01-13 | End: 2020-01-13

## 2020-01-12 RX ORDER — CIPROFLOXACIN HYDROCHLORIDE 3.5 MG/ML
4 SOLUTION/ DROPS TOPICAL 2 TIMES DAILY
Status: DISCONTINUED | OUTPATIENT
Start: 2020-01-12 | End: 2020-01-14

## 2020-01-12 RX ORDER — CIPROFLOXACIN AND DEXAMETHASONE 3; 1 MG/ML; MG/ML
4 SUSPENSION/ DROPS AURICULAR (OTIC) 2 TIMES DAILY
Status: DISCONTINUED | OUTPATIENT
Start: 2020-01-12 | End: 2020-01-12 | Stop reason: RX

## 2020-01-12 RX ORDER — LEVOTHYROXINE SODIUM 0.12 MG/1
125 TABLET ORAL DAILY
Status: DISCONTINUED | OUTPATIENT
Start: 2020-01-12 | End: 2020-01-17 | Stop reason: HOSPADM

## 2020-01-12 RX ORDER — CLOPIDOGREL BISULFATE 75 MG/1
75 TABLET ORAL DAILY
Status: DISCONTINUED | OUTPATIENT
Start: 2020-01-12 | End: 2020-01-17 | Stop reason: HOSPADM

## 2020-01-12 RX ORDER — SODIUM CHLORIDE 0.9 % (FLUSH) 0.9 %
10 SYRINGE (ML) INJECTION PRN
Status: DISCONTINUED | OUTPATIENT
Start: 2020-01-12 | End: 2020-01-17 | Stop reason: HOSPADM

## 2020-01-12 RX ORDER — PHENYTOIN SODIUM 100 MG/1
200 CAPSULE, EXTENDED RELEASE ORAL NIGHTLY
Status: DISCONTINUED | OUTPATIENT
Start: 2020-01-12 | End: 2020-01-17 | Stop reason: HOSPADM

## 2020-01-12 RX ORDER — ONDANSETRON 2 MG/ML
4 INJECTION INTRAMUSCULAR; INTRAVENOUS EVERY 6 HOURS PRN
Status: DISCONTINUED | OUTPATIENT
Start: 2020-01-12 | End: 2020-01-17 | Stop reason: HOSPADM

## 2020-01-12 RX ORDER — LISINOPRIL 10 MG/1
10 TABLET ORAL DAILY
Status: DISCONTINUED | OUTPATIENT
Start: 2020-01-12 | End: 2020-01-14

## 2020-01-12 RX ORDER — DEXAMETHASONE SODIUM PHOSPHATE 1 MG/ML
4 SOLUTION/ DROPS OPHTHALMIC 2 TIMES DAILY
Status: DISCONTINUED | OUTPATIENT
Start: 2020-01-12 | End: 2020-01-17 | Stop reason: HOSPADM

## 2020-01-12 RX ADMIN — CIPROFLOXACIN 4 DROP: 3 SOLUTION OPHTHALMIC at 09:17

## 2020-01-12 RX ADMIN — SODIUM CHLORIDE, POTASSIUM CHLORIDE, SODIUM LACTATE AND CALCIUM CHLORIDE: 600; 310; 30; 20 INJECTION, SOLUTION INTRAVENOUS at 00:49

## 2020-01-12 RX ADMIN — CLOPIDOGREL 75 MG: 75 TABLET, FILM COATED ORAL at 09:11

## 2020-01-12 RX ADMIN — DEXAMETHASONE SODIUM PHOSPHATE 4 DROP: 1 SOLUTION/ DROPS OPHTHALMIC at 09:17

## 2020-01-12 RX ADMIN — ENOXAPARIN SODIUM 40 MG: 40 INJECTION SUBCUTANEOUS at 09:10

## 2020-01-12 RX ADMIN — TAZOBACTAM SODIUM AND PIPERACILLIN SODIUM 3.38 G: 375; 3 INJECTION, SOLUTION INTRAVENOUS at 00:48

## 2020-01-12 RX ADMIN — CIPROFLOXACIN 4 DROP: 3 SOLUTION OPHTHALMIC at 20:48

## 2020-01-12 RX ADMIN — TAZOBACTAM SODIUM AND PIPERACILLIN SODIUM 3.38 G: 375; 3 INJECTION, SOLUTION INTRAVENOUS at 09:18

## 2020-01-12 RX ADMIN — PHENYTOIN SODIUM 200 MG: 100 CAPSULE ORAL at 01:41

## 2020-01-12 RX ADMIN — TAZOBACTAM SODIUM AND PIPERACILLIN SODIUM 3.38 G: 375; 3 INJECTION, SOLUTION INTRAVENOUS at 17:40

## 2020-01-12 RX ADMIN — PHENYTOIN SODIUM 200 MG: 100 CAPSULE ORAL at 20:47

## 2020-01-12 RX ADMIN — DEXAMETHASONE SODIUM PHOSPHATE 4 DROP: 1 SOLUTION/ DROPS OPHTHALMIC at 20:48

## 2020-01-12 RX ADMIN — SODIUM CHLORIDE, POTASSIUM CHLORIDE, SODIUM LACTATE AND CALCIUM CHLORIDE: 600; 310; 30; 20 INJECTION, SOLUTION INTRAVENOUS at 16:04

## 2020-01-12 RX ADMIN — LISINOPRIL 10 MG: 10 TABLET ORAL at 09:10

## 2020-01-12 RX ADMIN — LEVOTHYROXINE SODIUM 125 MCG: 125 TABLET ORAL at 09:11

## 2020-01-12 ASSESSMENT — PAIN SCALES - GENERAL
PAINLEVEL_OUTOF10: 0

## 2020-01-12 NOTE — PROGRESS NOTES
dry, no rashes appreciated    Labs and Tests:  CBC:   Recent Labs     01/11/20 2058   WBC 13.9*   HGB 9.5*        BMP:    Recent Labs     01/11/20 2057      K 4.4      CO2 19*   BUN 22*   CREATININE 1.1   GLUCOSE 146*     Hepatic:   Recent Labs     01/11/20 2057   AST 31   ALT 18   BILITOT 0.8   ALKPHOS 191*     No orders to display       Recent imaging reviewed    Problem List  Active Problems:    Complicated UTI (urinary tract infection)    Otorrhea of right ear  Resolved Problems:    * No resolved hospital problems.  *       Assessment & Plan:   Acute complicated UTI  - continue zosyn day 1, fu cultures and tailor atbx    Acute otitis media with spontaneous perforation of the right tympanic membrane and serous otitis media of the left ear  - continue abx cultures pending  - ear drops on right per ENT, appreciate recs    Dehydration: elevated bun  - continue fluids recheck labs in am    Hyperglycemia: check a1c    Macrocytic anemia: check iron panel, b12,     HTN: home meds    Hypothyroidism: home meds    Diet: DIET GENERAL;  Code:Full Code  DVT PPXlovenox  Disposition home in 1-2 days      Maureen Allan MD   1/12/2020 9:06 AM

## 2020-01-12 NOTE — ED PROVIDER NOTES
eMERGENCY dEPARTMENT eNCOUnter      [facility]    Chief Complaint   Patient presents with    Fatigue     [facility] ems. pt with weakness low grade fevers and lower ABD pain at home. lives with daughter who is on her way        HPI    Merle Cooper is a 80 y.o. female who presents with fatigue. Her daughter states that she has been so weak that she is unable to take care of her activities of daily life. Is hard for her to stand up and she is been in bed all day. She states that she has been a little bit confused as well. Her daughter states that last time she had a urinary tract infection she responded like this. She does not have any focal weakness in any particular limb and no facial droop. No difficulty swallowing or speaking. She is hard of hearing. No exacerbating or relieving factors no other associated signs or symptoms.     PAST MEDICAL HISTORY    Past Medical History:   Diagnosis Date    Arthritis     Cancer ([address]     skin on back    Hypercholesterolemia     Hypertension     Neuromuscular disorder ([address]     peripheral neuropathy    Neuropathy     Psychiatric problem     dementia    Seizures [address] [address]     had seizures at time of stroke    Thyroid disease     hypothyroidism    Unspecified cerebral artery occlusion with cerebral infarction approximately 10 years ago    slight left side weakness per patient as a result of stroke       SURGICAL HISTORY    Past Surgical History:   Procedure Laterality Date    CATARACT REMOVAL Bilateral 2004    CHOLECYSTECTOMY      COLONOSCOPY      FEMUR FRACTURE SURGERY Right 07/17/2018    right hip gamma nail    FRACTURE SURGERY      left shoulder    HYSTERECTOMY      INNER EAR SURGERY Right 7/17/2015    ACTUAL PROCEDURE: MYRINGOTOMY BILATERAL PRESSURE EQUALIZING TUBE INSERTION RIGHT EAR     JOINT REPLACEMENT Left 12/12/09    knee    SKIN BIOPSY      and removal on back    THYROID SURGERY      TONSILLECTOMY         CURRENT MEDICATIONS  Alcohol Abuse Daughter     Alcohol Abuse Son     Stroke Daughter        SOCIAL HISTORY    Social History     Socioeconomic History    Marital status:       Spouse name: None    Number of children: 3    Years of education: None    Highest education level: None   Occupational History    Occupation:      Employer: [employer]    Financial resource strain: None    Food insecurity:     Worry: None     Inability: None    Transportation needs:     Medical: None     Non-medical: None   Tobacco Use    Smoking status: Never Smoker    Smokeless tobacco: Never Used   Substance and Sexual Activity    Alcohol use: No     Alcohol/week: 0.0 standard drinks    Drug use: No    Sexual activity: None   Lifestyle    Physical activity:     Days per week: None     Minutes per session: None    Stress: None   Relationships    Social connections:     Talks on phone: None     Gets together: None     Attends Confucianist service: None     Active member of club or organization: None     Attends meetings of clubs or organizations: None     Relationship status: None    Intimate partner violence:     Fear of current or ex partner: None     Emotionally abused: None     Physically abused: None     Forced sexual activity: None   Other Topics Concern    None   Social History Narrative    None       REVIEW OF SYSTEMS    Constitutional: weakness   Eyes:  Denies photophobia or discharge   HENT:  Denies sore throat or ear pain   Respiratory:  Denies cough or shortness of breath   Cardiovascular:  Denies chest pain, palpitations or swelling   GI:  Denies abdominal pain, nausea, vomiting, or diarrhea   Musculoskeletal:  Denies back pain   Skin:  Denies rash   Neurologic:  Denies headache, focal weakness or sensory changes   Endocrine:  Denies polyuria or polydypsia   Lymphatic:  Denies swollen glands   Psychiatric:  Denies depression, suicidal ideation or homicidal ideation   All systems negative except as marked. PHYSICAL EXAM    VITAL SIGNS: BP (!) 139/50   Pulse 90   Temp 99.2 °F (37.3 °C)   Resp 20   Ht 5' 6\" (1.676 m)   Wt 135 lb (61.2 kg)   SpO2 93%   BMI 21.79 kg/m²    Constitutional:  Well developed, Well nourished, No acute distress, Non-toxic appearance. HENT:  Normocephalic, Atraumatic, Bilateral external ears normal, Oropharynx moist, No oral exudates, Nose normal. Neck- Normal range of motion, No tenderness, Supple, No stridor. Eyes:  PERRL, EOMI, Conjunctiva normal, No discharge. Respiratory:  Normal breath sounds, No respiratory distress, No wheezing, No chest tenderness. Cardiovascular:  Normal heart rate, Normal rhythm, No murmurs, No rubs, No gallops. GI:  Bowel sounds normal, Soft, No tenderness, No masses, No pulsatile masses. Musculoskeletal:  Intact distal pulses, No edema, No tenderness, No cyanosis, No clubbing. Good range of motion in all major joints. No tenderness to palpation or major deformities noted. Back- No tenderness. Integument:  Warm, Dry, No erythema, No rash. Lymphatic:  No lymphadenopathy noted. Neurologic:  Alert & oriented x 3, Normal motor function, Normal sensory function, No focal deficits noted. Alert and oriented to person, place, time, and situation. CN II-XII intact as tested. Strength 5/5 in upper and lower extremities bilaterally. Normal reflexes. No pronator drift. Normal sensation to light touch. Normal finger-nose bilaterally. Normal heel-shin bilaterally. Gait steady and without difficulty. No speech difficulties or slurring.     Psychiatric:  Affect normal, Judgment normal, Mood normal.     EKG        RADIOLOGY        PROCEDURES    Labs Reviewed   CBC WITH AUTO DIFFERENTIAL - Abnormal; Notable for the following components:       Result Value    WBC 13.9 (*)     RBC 2.79 (*)     Hemoglobin 9.5 (*)     Hematocrit 28.9 (*)     RDW 18.4 (*)     Neutrophils Absolute 11.8 (*)     All other components within normal limits antibiotic and some fluid here in the emergency department. Again, no focal weakness to suggest a central cause of her symptoms. She has no other electrolyte abnormalities and particularly blood glucose is normal.  She does have a slight leukocytosis which goes along with infectious illness. She has no sign of serious bacterial illness on physical examination. Her vital signs are stable as well but since she can handle activities of daily life and is too weak to get up, I think it is best that she be admitted. I have put out a page for the hospitalist.    FINAL IMPRESSION    1.  Urinary tract infection without hematuria, site unspecified             Maggie Nguyen MD  01/11/20 5028

## 2020-01-13 LAB
ANION GAP SERPL CALCULATED.3IONS-SCNC: 11 MMOL/L (ref 3–16)
BASOPHILS ABSOLUTE: 0.1 K/UL (ref 0–0.2)
BASOPHILS RELATIVE PERCENT: 0.8 %
BUN BLDV-MCNC: 12 MG/DL (ref 7–20)
CALCIUM SERPL-MCNC: 8.9 MG/DL (ref 8.3–10.6)
CHLORIDE BLD-SCNC: 105 MMOL/L (ref 99–110)
CO2: 20 MMOL/L (ref 21–32)
CREAT SERPL-MCNC: 0.9 MG/DL (ref 0.6–1.2)
EOSINOPHILS ABSOLUTE: 0.1 K/UL (ref 0–0.6)
EOSINOPHILS RELATIVE PERCENT: 0.7 %
ESTIMATED AVERAGE GLUCOSE: 96.8 MG/DL
GFR AFRICAN AMERICAN: >60
GFR NON-AFRICAN AMERICAN: 59
GLUCOSE BLD-MCNC: 116 MG/DL (ref 70–99)
HBA1C MFR BLD: 5 %
HCT VFR BLD CALC: 26.9 % (ref 36–48)
HEMOGLOBIN: 9 G/DL (ref 12–16)
LYMPHOCYTES ABSOLUTE: 1.1 K/UL (ref 1–5.1)
LYMPHOCYTES RELATIVE PERCENT: 13 %
MCH RBC QN AUTO: 34.5 PG (ref 26–34)
MCHC RBC AUTO-ENTMCNC: 33.6 G/DL (ref 31–36)
MCV RBC AUTO: 102.6 FL (ref 80–100)
MONOCYTES ABSOLUTE: 0.6 K/UL (ref 0–1.3)
MONOCYTES RELATIVE PERCENT: 7.8 %
NEUTROPHILS ABSOLUTE: 6.3 K/UL (ref 1.7–7.7)
NEUTROPHILS RELATIVE PERCENT: 77.7 %
ORGANISM: ABNORMAL
PDW BLD-RTO: 18.8 % (ref 12.4–15.4)
PLATELET # BLD: 274 K/UL (ref 135–450)
PMV BLD AUTO: 8 FL (ref 5–10.5)
POTASSIUM SERPL-SCNC: 3.8 MMOL/L (ref 3.5–5.1)
RBC # BLD: 2.62 M/UL (ref 4–5.2)
SODIUM BLD-SCNC: 136 MMOL/L (ref 136–145)
URINE CULTURE, ROUTINE: ABNORMAL
WBC # BLD: 8.2 K/UL (ref 4–11)

## 2020-01-13 PROCEDURE — 85025 COMPLETE CBC W/AUTO DIFF WBC: CPT

## 2020-01-13 PROCEDURE — 80048 BASIC METABOLIC PNL TOTAL CA: CPT

## 2020-01-13 PROCEDURE — 6370000000 HC RX 637 (ALT 250 FOR IP): Performed by: INTERNAL MEDICINE

## 2020-01-13 PROCEDURE — 2580000003 HC RX 258: Performed by: INTERNAL MEDICINE

## 2020-01-13 PROCEDURE — 1200000000 HC SEMI PRIVATE

## 2020-01-13 PROCEDURE — 99223 1ST HOSP IP/OBS HIGH 75: CPT | Performed by: INTERNAL MEDICINE

## 2020-01-13 PROCEDURE — 6360000002 HC RX W HCPCS: Performed by: INTERNAL MEDICINE

## 2020-01-13 PROCEDURE — 97535 SELF CARE MNGMENT TRAINING: CPT

## 2020-01-13 PROCEDURE — 97166 OT EVAL MOD COMPLEX 45 MIN: CPT

## 2020-01-13 PROCEDURE — 6370000000 HC RX 637 (ALT 250 FOR IP): Performed by: NURSE PRACTITIONER

## 2020-01-13 PROCEDURE — 2500000003 HC RX 250 WO HCPCS: Performed by: INTERNAL MEDICINE

## 2020-01-13 PROCEDURE — 97530 THERAPEUTIC ACTIVITIES: CPT

## 2020-01-13 PROCEDURE — 36415 COLL VENOUS BLD VENIPUNCTURE: CPT

## 2020-01-13 PROCEDURE — 87040 BLOOD CULTURE FOR BACTERIA: CPT

## 2020-01-13 PROCEDURE — 97162 PT EVAL MOD COMPLEX 30 MIN: CPT

## 2020-01-13 RX ADMIN — DEXAMETHASONE SODIUM PHOSPHATE 4 DROP: 1 SOLUTION/ DROPS OPHTHALMIC at 20:35

## 2020-01-13 RX ADMIN — Medication 1 G: at 16:07

## 2020-01-13 RX ADMIN — ACETAMINOPHEN 650 MG: 325 TABLET, FILM COATED ORAL at 00:47

## 2020-01-13 RX ADMIN — LEVOTHYROXINE SODIUM 125 MCG: 125 TABLET ORAL at 07:09

## 2020-01-13 RX ADMIN — CIPROFLOXACIN 4 DROP: 3 SOLUTION OPHTHALMIC at 20:35

## 2020-01-13 RX ADMIN — Medication 10 ML: at 20:36

## 2020-01-13 RX ADMIN — VANCOMYCIN HYDROCHLORIDE 750 MG: 750 INJECTION, POWDER, LYOPHILIZED, FOR SOLUTION INTRAVENOUS at 16:18

## 2020-01-13 RX ADMIN — DEXAMETHASONE SODIUM PHOSPHATE 4 DROP: 1 SOLUTION/ DROPS OPHTHALMIC at 08:25

## 2020-01-13 RX ADMIN — CLOPIDOGREL 75 MG: 75 TABLET, FILM COATED ORAL at 08:25

## 2020-01-13 RX ADMIN — TAZOBACTAM SODIUM AND PIPERACILLIN SODIUM 3.38 G: 375; 3 INJECTION, SOLUTION INTRAVENOUS at 12:08

## 2020-01-13 RX ADMIN — CIPROFLOXACIN 4 DROP: 3 SOLUTION OPHTHALMIC at 08:25

## 2020-01-13 RX ADMIN — TAZOBACTAM SODIUM AND PIPERACILLIN SODIUM 3.38 G: 375; 3 INJECTION, SOLUTION INTRAVENOUS at 04:19

## 2020-01-13 RX ADMIN — LISINOPRIL 10 MG: 10 TABLET ORAL at 08:25

## 2020-01-13 RX ADMIN — ENOXAPARIN SODIUM 40 MG: 40 INJECTION SUBCUTANEOUS at 08:25

## 2020-01-13 RX ADMIN — PHENYTOIN SODIUM 200 MG: 100 CAPSULE ORAL at 21:02

## 2020-01-13 ASSESSMENT — PAIN SCALES - PAIN ASSESSMENT IN ADVANCED DEMENTIA (PAINAD)
CONSOLABILITY: 0
BODYLANGUAGE: 0
NEGVOCALIZATION: 0
CONSOLABILITY: 0
TOTALSCORE: 0
NEGVOCALIZATION: 0
NEGVOCALIZATION: 0
FACIALEXPRESSION: 0
CONSOLABILITY: 0
FACIALEXPRESSION: 0
TOTALSCORE: 0
NEGVOCALIZATION: 0
BREATHING: 0
NEGVOCALIZATION: 0
FACIALEXPRESSION: 0
BREATHING: 0
NEGVOCALIZATION: 0
FACIALEXPRESSION: 0
BREATHING: 0
FACIALEXPRESSION: 0
BODYLANGUAGE: 0
TOTALSCORE: 0
BREATHING: 0
FACIALEXPRESSION: 0
BODYLANGUAGE: 0
BREATHING: 0
BODYLANGUAGE: 0
BREATHING: 0
TOTALSCORE: 0
NEGVOCALIZATION: 0
TOTALSCORE: 0
CONSOLABILITY: 0
BODYLANGUAGE: 0
BODYLANGUAGE: 0
CONSOLABILITY: 0
FACIALEXPRESSION: 0
TOTALSCORE: 0
TOTALSCORE: 0
BREATHING: 0
FACIALEXPRESSION: 0
BODYLANGUAGE: 0
NEGVOCALIZATION: 0
FACIALEXPRESSION: 0
BREATHING: 0
BODYLANGUAGE: 0
BREATHING: 0
FACIALEXPRESSION: 0
NEGVOCALIZATION: 0
TOTALSCORE: 0
BODYLANGUAGE: 0
BODYLANGUAGE: 0
BREATHING: 0
BODYLANGUAGE: 0
NEGVOCALIZATION: 0
CONSOLABILITY: 0
CONSOLABILITY: 0
FACIALEXPRESSION: 0
TOTALSCORE: 0
CONSOLABILITY: 0
FACIALEXPRESSION: 0
CONSOLABILITY: 0
CONSOLABILITY: 0
NEGVOCALIZATION: 0
CONSOLABILITY: 0
NEGVOCALIZATION: 0
BODYLANGUAGE: 0
CONSOLABILITY: 0

## 2020-01-13 ASSESSMENT — PAIN DESCRIPTION - LOCATION: LOCATION: EAR

## 2020-01-13 ASSESSMENT — ENCOUNTER SYMPTOMS
CHOKING: 0
TROUBLE SWALLOWING: 0
EYE REDNESS: 0
EYE DISCHARGE: 0
CHEST TIGHTNESS: 0
APNEA: 0
FACIAL SWELLING: 0
STRIDOR: 0
ABDOMINAL PAIN: 0
COLOR CHANGE: 0
NAUSEA: 0
BLOOD IN STOOL: 0
DIARRHEA: 0
COUGH: 0
RHINORRHEA: 0
SHORTNESS OF BREATH: 0
PHOTOPHOBIA: 0

## 2020-01-13 ASSESSMENT — PAIN SCALES - GENERAL
PAINLEVEL_OUTOF10: 0
PAINLEVEL_OUTOF10: 4
PAINLEVEL_OUTOF10: 0
PAINLEVEL_OUTOF10: 0

## 2020-01-13 ASSESSMENT — PAIN DESCRIPTION - ORIENTATION: ORIENTATION: RIGHT

## 2020-01-13 NOTE — PROGRESS NOTES
Patient has no IV access, per night shift RN, patient pulled lines out and they could not regain access. I spoke to the PICC team who stated they would attempt to gain access on the patient today. Patient in chair, family at bedside.

## 2020-01-13 NOTE — CONSULTS
Infectious Diseases   Consult Note        Admission Date: 1/11/2020  Hospital Day: Hospital Day: 3   Attending: Chet Hernandez MD  Date of service: 1/13/20     Reason for admission: Complicated UTI (urinary tract infection) [L10.3]  Complicated UTI (urinary tract infection) [N39.0]    Chief complaint/ Reason for consult: MRSA right ear infection, complicated UTI    Microbiology:        I have reviewed allavailable micro lab data and cultures    · Right ear drainage culture - collectedon 1/11/2020: MRSA  · Urine culture  - collected on 1/11/2020: > 100,000 CFU per mL of E. coli      Antibiotics and immunizations:       Current antibiotics: All antibiotics and their doses were reviewed by me    Recent Abx Admin                   vancomycin (VANCOCIN) 750 mg in dextrose 5 % 250 mL IVPB (mg) 750 mg New Bag 01/13/20 1618    cefTRIAXone (ROCEPHIN) 1 g in sterile water 10 mL IV syringe (g) 1 g Given 01/13/20 1607    piperacillin-tazobactam (ZOSYN) 3.375 g in dextrose 50 mL IVPB extended infusion (premix) (g) 3.375 g New Bag 01/13/20 1208     3.375 g New Bag  0419     3.375 g New Bag 01/12/20 1740                  Immunization History: All immunization history was reviewed by me today. Immunization History   Administered Date(s) Administered    Influenza Virus Vaccine 10/07/2010, 01/03/2014, 11/30/2015    Influenza, High Dose (Fluzone 65 yrs and older) 11/21/2012, 01/17/2017, 09/11/2017, 10/24/2018    Influenza, Triv, inactivated, subunit, adjuvanted, IM (Fluad 65 yrs and older) 12/05/2019    Pneumococcal Conjugate 13-valent ([id number]) 01/27/2015    Pneumococcal Polysaccharide ([id number]) 11/13/1997    Td, unspecified formulation 09/25/1995    Tdap (Boostrix, Adacel) 04/10/2015       Known drug allergies:      All allergies were reviewed and updated    Allergies   Allergen Reactions    Aspirin Other (See Comments)     unknown    Butalbital-Aspirin-Caffeine Other (See Comments)     unknown    Daypro for severe exacerbation of infection/side effects of treatment. · Therapy requires intensive monitoring for antimicrobial agent toxicity. Thank you for involving me in the care of your patient. I will continue to follow. If you have any additional questions, please do not hesitate to contact me. Subjective:     Presenting complaint in ER:     Chief Complaint   Patient presents with    Fatigue     [facility]. pt with weakness low grade fevers and lower ABD pain at home. lives with daughter who is on her way         HPI: Divina Livingston is a 80 y.o. female patient, who was seen at the request of Dr. Marsha Roach MD.    History was obtained from chart review and the patient. The patient was admitted on 1/11/2020. I have been consulted to see the patient for above mentioned reason(s). The patient has multiple medical comorbidities, and presented to the ER for lower abdominal pain and low-grade fever. The patient is quite weak and is not able to take it off for activities of daily living and is in bed most of the time. The patient was becoming more encephalopathic over past few days. She was also having low-grade fever. Her daughter was concerned about possibility of a UTI as she has done that before when she had a UTI. The patient was brought to the ER. In the ER, she was noted to have white cell count of 13,900. She was admitted. She was started on IV Zosyn. The patient also was having purulent discharge from her right ear. A culture was sent of the purulent material that has come back positive for MRSA. Urine culture is growing E. coli. I have been asked to see the patient for this complicated infection. Past Medical History: All past medical history reviewed today.     Past Medical History:   Diagnosis Date    Arthritis     Cancer ([facility] [address]     skin on back    Hypercholesterolemia     Hypertension     Neuromuscular disorder ([facility] [address]     peripheral DAILY  clopidogrel (PLAVIX) 75 MG tablet, TAKE 1 TABLET BY MOUTH  EVERY DAY  docusate sodium (COLACE) 100 MG capsule, TAKE 1 CAPSULE BY MOUTH EVERY DAY  Pramox-PE-Glycerin-Petrolatum (PREPARATION H) 1-0.25-14.4-15 % CREA rectal cream, Place rectally 2 times daily  ondansetron (ZOFRAN) 4 MG tablet, TAKE 1 TABLET BY MOUTH DAILY AS NEEDED FOR NAUSEA OR VOMITING  Dextromethorphan-Guaifenesin (MUCINEX DM)  MG TB12, Take by mouth nightly     vancomycin  750 mg Intravenous Q24H    cefTRIAXone (ROCEPHIN) IV  1 g Intravenous Q24H    clopidogrel  75 mg Oral Daily    levothyroxine  125 mcg Oral Daily    lisinopril  10 mg Oral Daily    phenytoin  200 mg Oral Nightly    sodium chloride flush  10 mL Intravenous 2 times per day    enoxaparin  40 mg Subcutaneous Daily    ciprofloxacin  4 drop Right Ear BID    dexamethasone  4 drop Right Ear BID          REVIEW OF SYSTEMS:       Review of Systems   Constitutional: Positive for fatigue. Negative for chills, diaphoresis and unexpected weight change. HENT: Positive for ear discharge and ear pain. Negative for congestion, facial swelling, hearing loss, rhinorrhea and trouble swallowing. Right side   Eyes: Negative for photophobia, discharge, redness and visual disturbance. Respiratory: Negative for apnea, cough, choking, chest tightness, shortness of breath and stridor. Cardiovascular: Negative for chest pain and palpitations. Gastrointestinal: Negative for abdominal pain, blood in stool, diarrhea and nausea. Endocrine: Negative for polydipsia, polyphagia and polyuria. Genitourinary: Negative for difficulty urinating, dysuria, frequency, hematuria, menstrual problem and vaginal discharge. Musculoskeletal: Negative for arthralgias, joint swelling, myalgias and neck stiffness. Skin: Negative for color change and rash. Allergic/Immunologic: Negative for immunocompromised state.    Neurological: Negative for dizziness, seizures, speech difficulty, light-headedness and headaches. Hematological: Negative for adenopathy. Psychiatric/Behavioral: Negative for agitation, hallucinations and suicidal ideas. Objective:       PHYSICAL EXAM:      Vitals:   Vitals:    01/13/20 0824 01/13/20 0825 01/13/20 1206 01/13/20 1600   BP: (!) 156/71 (!) 156/71 (!) 145/75 (!) 150/43   Pulse: 87  82 65   Resp: 18  18 16   Temp: 97.6 °F (36.4 °C)  97.7 °F (36.5 °C) 97.9 °F (36.6 °C)   TempSrc: Axillary  Axillary Axillary   SpO2: 93%  94% 98%   Weight:       Height:           Physical Exam  Vitals signs and nursing note reviewed. Constitutional:       General: She is not in acute distress. Appearance: She is well-developed. She is not diaphoretic. HENT:      Head: Normocephalic. Right Ear: External ear normal.      Left Ear: External ear normal.      Ears:      Comments: Right ear still has  tenderness     Nose: Nose normal.   Eyes:      General: No scleral icterus. Right eye: No discharge. Left eye: No discharge. Conjunctiva/sclera: Conjunctivae normal.      Pupils: Pupils are equal, round, and reactive to light. Neck:      Musculoskeletal: Normal range of motion and neck supple. Cardiovascular:      Rate and Rhythm: Normal rate and regular rhythm. Heart sounds: No murmur. No friction rub. Pulmonary:      Effort: Pulmonary effort is normal.      Breath sounds: No stridor. No wheezing or rales. Chest:      Chest wall: No tenderness. Abdominal:      Palpations: Abdomen is soft. There is no mass. Tenderness: There is no tenderness. There is no guarding or rebound. Musculoskeletal:         General: No tenderness. Lymphadenopathy:      Cervical: No cervical adenopathy. Skin:     General: Skin is warm and dry. Findings: No erythema or rash. Neurological:      Mental Status: She is alert and oriented to person, place, and time. Motor: No abnormal muscle tone.    Psychiatric:         Judgment: Judgment normal. Lines: All vascular access sites are healthy with no local erythema, discharge or tenderness. Intake and output:     I/O last 3 completed shifts: In: 2128.7 [P.O.:360; I.V.:1718.7; IV Piggyback:50]  Out: -     Lab Data:   All available labs were reviewed by me today. CBC:   Recent Labs     01/11/20 2058 01/13/20  0522   WBC 13.9* 8.2   RBC 2.79* 2.62*   HGB 9.5* 9.0*   HCT 28.9* 26.9*    274   .5* 102.6*   MCH 33.9 34.5*   MCHC 32.7 33.6   RDW 18.4* 18.8*        BMP:  Recent Labs     01/11/20 2057 01/13/20  0523    136   K 4.4 3.8    105   CO2 19* 20*   BUN 22* 12   CREATININE 1.1 0.9   CALCIUM 8.5 8.9   GLUCOSE 146* 116*        Hepatic FunctionPanel:   Lab Results   Component Value Date    ALKPHOS 191 01/11/2020    ALT 18 01/11/2020    AST 31 01/11/2020    PROT 7.1 01/11/2020    BILITOT 0.8 01/11/2020    BILIDIR <0.2 11/30/2015    IBILI see below 11/30/2015    LABALBU 3.8 01/11/2020       CPK:   Lab Results   Component Value Date    CKTOTAL 89 01/02/2014     ESR: No results found for: SEDRATE  CRP: No results found for: CRP      Imaging: All pertinent images and reports for the current visit were reviewed by meduring this visit. No orders to display       Outside records:    Labs, Microbiology, Radiology and pertinent results from Care everywhere, if available, were reviewed as a part ofthe consultation.       Problem list:       Patient Active Problem List   Diagnosis Code    Major depressive disorder with single episode, in full remission ([facility] [address] F32.5    Essential hypertension I10    Hypothyroidism E03.9    Osteoarthritis M19.90    Chronic fatigue syndrome R53.82    Migraine G43.909    Peripheral neuropathy G62.9    Cerebral artery occlusion with cerebral infarction (HCC) I63.50    Seizure disorder (HCC) G40.909    Parkinson disease ([facility] [address] G20    Hearing disorder, sensorineural H90.5    Frequent falls R29.6    Weakness generalized R53.1    Dementia with

## 2020-01-13 NOTE — PROGRESS NOTES
Per micro, patient's wound culture is positive for MRSA. Dr. Amelia Martini informed, patient placed in contact precautions.

## 2020-01-13 NOTE — PROGRESS NOTES
Occupational Therapy   Occupational Therapy Initial Assessment  Date: 2020   Patient Name: Mckinley Vazquez  MRN: [id number]     : 1929    Date of Service: 2020    Discharge Recommendations:    Mckinley Vazquez scored a  on the AM-PAC ADL Inpatient form. Current research shows that an AM-PAC score of 17 or less is typically not associated with a discharge to the patient's home setting. Based on the patients AM-PAC score and their current ADL deficits, it is recommended that the patient have 3-5 sessions per week of Occupational Therapy at d/c to increase the patients independence. OT Equipment Recommendations  Other: Defer to next level of care    Assessment   Performance deficits / Impairments: Decreased functional mobility ; Decreased strength;Decreased endurance;Decreased ADL status; Decreased safe awareness;Decreased balance;Decreased cognition  Assessment: Patient functioning below baseline due to the above mentioned deficits. Patient would benefit from ongoing skilled OT to increase functional status. Prognosis: Fair  Decision Making: Medium Complexity  Patient Education: role of OT, safety, calling for assistance with call light, transfer training, ADL training   Barriers to Learning: cognition   REQUIRES OT FOLLOW UP: Yes  Activity Tolerance  Activity Tolerance: Treatment limited secondary to decreased cognition;Patient limited by fatigue  Safety Devices  Safety Devices in place: Yes  Type of devices: All fall risk precautions in place; Left in chair;Call light within reach; Chair alarm in place; Patient at risk for falls;Nurse notified           Patient Diagnosis(es): The encounter diagnosis was Urinary tract infection without hematuria, site unspecified.      has a past medical history of Arthritis, Cancer ([address] Hypercholesterolemia, Hypertension, Neuromuscular disorder ([address] Neuropathy, Psychiatric problem, Seizures ([address] Thyroid disease, and Unspecified cerebral artery occlusion patients bed)  Receives Help From: Family  ADL Assistance: Needs assistance- Daughter reports patient is able to use the bathroom with intermittent assistance with hygiene. Homemaking Assistance: Needs assistance  Homemaking Responsibilities: No  Ambulation Assistance: Needs assistance(quad cane at all times)  Transfer Assistance: Needs assistance  Active : No  Patient's  Info: Family provides transportation. Additional Comments: Social history from 7/2018 admission        Objective   Vision: Impaired  Hearing: Exceptions to [facility]  Hearing Exceptions: Hard of hearing/hearing concerns    Orientation  Overall Orientation Status: Impaired  Orientation Level: Oriented to time;Oriented to place;Oriented to person;Disoriented to situation  Observation/Palpation  Posture: Fair  Balance  Sitting Balance: Contact guard assistance  Standing Balance: Minimal assistance  Standing Balance  Activity: toilet transfer to bedside commode, functional mobility   Comment: decreased safety awarness; slow speed; min assistance to navigate RW safely   Functional Mobility  Functional - Mobility Device: Rolling Walker  Activity: (bed to [facility])  Assist Level: Minimal assistance  Functional Mobility Comments: decrease awareness and cognition; impaired safety   Toilet Transfers  Toilet - Technique: Ambulating  Equipment Used: Standard bedside commode  Toilet Transfer: Minimal assistance  Toilet Transfers Comments: decreased awarness of hand placement  ADL  LE Dressing: Dependent/Total(donning shoes and brief)  Toileting: Dependent/Total(patient soiled from BM; required total assist for toileting)  Additional Comments: patient requires assistance from 2 caregivers during ADLs due to high level of physical assistance for ADLs and additional assistance for balance in stance.    Tone RUE  RUE Tone: Normotonic  Tone LUE  LUE Tone: Normotonic  Coordination  Movements Are Fluid And Coordinated: Yes     Bed mobility  Supine to Sit: Stand by

## 2020-01-13 NOTE — DISCHARGE INSTR - COC
Continuity of Care Form    Patient Name: Dina Henderson   :  1929  MRN:  [id number]    Admit date:  2020  Discharge date:  20    Code Status Order: Full Code   Advance Directives:   Advance Care Flowsheet Documentation     Date/Time Healthcare Directive Type of Healthcare Directive Copy in [address] Agent's Name Healthcare Agent's Phone Number    20 0025  Unknown, patient unable to respond due to medical condition -- -- -- -- --          Admitting Physician:  Jay Hicks MD  PCP: Candy Gagnon MD    Discharging Nurse: [facility] Unit/Room#: 6XB-3979/5255-55  Discharging Unit Phone Number: [phone]    Emergency Contact:   Extended Emergency Contact Information  Primary Emergency Contact: Letha Villa  Address: [place] [address] Phone: [phone]  Relation: Child  Secondary Emergency Contact: [facility]  Address: [address] Phone: [phone]  Relation: Spouse    Past Surgical History:  Past Surgical History:   Procedure Laterality Date    CATARACT REMOVAL Bilateral     CHOLECYSTECTOMY      COLONOSCOPY      FEMUR FRACTURE SURGERY Right 2018    right hip gamma nail    FRACTURE SURGERY      left shoulder    HYSTERECTOMY      INNER EAR SURGERY Right 2015    ACTUAL PROCEDURE: MYRINGOTOMY BILATERAL PRESSURE EQUALIZING TUBE INSERTION RIGHT EAR     JOINT REPLACEMENT Left 09    knee    SKIN BIOPSY      and removal on back    THYROID SURGERY      TONSILLECTOMY         Immunization History:   Immunization History   Administered Date(s) Administered    Influenza Virus Vaccine 10/07/2010, 2014, 2015    Influenza, High Dose (Fluzone 65 yrs and older) 2012, 2017, 2017, 10/24/2018    Influenza, Triv, inactivated, subunit, adjuvanted, IM (Fluad 65 yrs and older) 2019    Incision 07/17/18 Knee Outer;Right (Active)   Number of days: 545       Wound 01/12/20 Sacrum Mid stage II (Active)   Dressing/Treatment Moisture barrier; Other (comment) 1/12/2020  4:03 PM   Wound Cleansed Soap and water 1/13/2020  1:00 AM   Wound Assessment Red;Purple 1/13/2020  1:00 AM   Drainage Amount None 1/13/2020  1:00 AM   Odor None 1/13/2020  1:00 AM   Caitie-wound Assessment Excoriated; Purple;Red 1/13/2020  1:00 AM   Number of days: 1       Wound 01/12/20 Buttocks Right;Left stage II (Active)   Wound Pressure Stage  2 1/13/2020  1:00 AM   Dressing/Treatment Moisture barrier 1/13/2020  1:00 AM   Wound Cleansed Soap and water 1/13/2020  1:00 AM   Wound Assessment Purple;Red 1/13/2020  1:00 AM   Drainage Amount None 1/13/2020  1:00 AM   Odor None 1/13/2020  1:00 AM   Caitie-wound Assessment Excoriated;Pink;Purple 1/13/2020  1:00 AM   Number of days: 1       Wound 01/12/20 Buttocks Left stage II (Active)   Dressing/Treatment Other (comment) 1/12/2020  4:03 PM   Wound Cleansed Wound cleanser 1/12/2020  9:00 AM   Wound Assessment Pink;Purple;Red 1/12/2020  4:03 PM   Drainage Amount None 1/12/2020  4:03 PM   Odor None 1/12/2020  4:03 PM   Caitie-wound Assessment Dry;Excoriated 1/12/2020  4:03 PM   Number of days: 1       Wound 01/12/20 Buttocks Right stage II (Active)   Dressing/Treatment Other (comment) 1/12/2020  4:03 PM   Wound Cleansed Wound cleanser 1/12/2020  9:00 AM   Wound Assessment Pink;Purple;Red 1/12/2020  4:03 PM   Drainage Amount None 1/12/2020  4:03 PM   Odor None 1/12/2020  4:03 PM   Caitie-wound Assessment Dry;Excoriated 1/12/2020  4:03 PM   Number of days: 1        Elimination:  Continence:   · Bowel: No  · Bladder: No  Urinary Catheter: None   Colostomy/Ileostomy/Ileal Conduit: No       Date of Last BM: 1/17/20    Intake/Output Summary (Last 24 hours) at 1/13/2020 1322  Last data filed at 1/13/2020 0824  Gross per 24 hour   Intake 1888.74 ml   Output --   Net 1888.74 ml     I/O last 3 completed Yuliet Mesa MD on 1/17/20 at 3:28 PM

## 2020-01-13 NOTE — PLAN OF CARE
Pt has remained free of physical injuries or falls thus far this shift. Fall safety in place according to protocol and pt needs. I will continue to monitor and reinforce as needed.

## 2020-01-13 NOTE — CARE COORDINATION
Discharge Planning Assessment  RN/SW discharge planner met with patient/(and family member) to discuss reason for admission, current living situation, and potential needs at the time of discharge completed with daughter Hal Omalley    Demographics/Insurance verified Yes- [facility]  Medicare     Current type of dwelling: tri-level 5 steps up to bathroom and bedroom     Patient from ECF/SW confirmed with:    Living arrangements:daughter Letha and son-in-law and grand daughter     Level of function/Support:requires assistance for all adl's. Family is supportive. PCP:Wilner Gomez     Last Visit to PCP:2 weeks ago     DME:2 wheel walker, cane, shower chair, hand held shower, grab bars, high seated toilet, bedside commode     Active with any community resources/agencies/skilled home care: [employer] - consumer direct care pay for daughter. Medication compliance issues:daughter dispenses medications     Financial issues that could impact healthcare:none     Transportation at the time of discharge: transportation service    Tentative discharge plan: snf based on therapy scores. CM will follow for discharge instructions.      Shady Boudreaux RN, BSN  [phone]

## 2020-01-14 ENCOUNTER — APPOINTMENT (OUTPATIENT)
Dept: MRI IMAGING | Age: 85
DRG: 689 | End: 2020-01-14
Payer: MEDICARE

## 2020-01-14 PROCEDURE — 99233 SBSQ HOSP IP/OBS HIGH 50: CPT | Performed by: INTERNAL MEDICINE

## 2020-01-14 PROCEDURE — 87040 BLOOD CULTURE FOR BACTERIA: CPT

## 2020-01-14 PROCEDURE — 6360000002 HC RX W HCPCS: Performed by: INTERNAL MEDICINE

## 2020-01-14 PROCEDURE — 94760 N-INVAS EAR/PLS OXIMETRY 1: CPT

## 2020-01-14 PROCEDURE — 6370000000 HC RX 637 (ALT 250 FOR IP): Performed by: OTOLARYNGOLOGY

## 2020-01-14 PROCEDURE — 6370000000 HC RX 637 (ALT 250 FOR IP): Performed by: INTERNAL MEDICINE

## 2020-01-14 PROCEDURE — 1200000000 HC SEMI PRIVATE

## 2020-01-14 PROCEDURE — 70551 MRI BRAIN STEM W/O DYE: CPT

## 2020-01-14 RX ORDER — QUETIAPINE FUMARATE 25 MG/1
12.5 TABLET, FILM COATED ORAL ONCE
Status: DISCONTINUED | OUTPATIENT
Start: 2020-01-14 | End: 2020-01-14

## 2020-01-14 RX ORDER — LORAZEPAM 1 MG/1
1 TABLET ORAL ONCE
Status: COMPLETED | OUTPATIENT
Start: 2020-01-14 | End: 2020-01-14

## 2020-01-14 RX ORDER — NEOMYCIN SULFATE, POLYMYXIN B SULFATE AND HYDROCORTISONE 10; 3.5; 1 MG/ML; MG/ML; [USP'U]/ML
3 SUSPENSION/ DROPS AURICULAR (OTIC) 3 TIMES DAILY
Status: DISCONTINUED | OUTPATIENT
Start: 2020-01-14 | End: 2020-01-17 | Stop reason: HOSPADM

## 2020-01-14 RX ORDER — LISINOPRIL 20 MG/1
20 TABLET ORAL DAILY
Status: DISCONTINUED | OUTPATIENT
Start: 2020-01-14 | End: 2020-01-17 | Stop reason: HOSPADM

## 2020-01-14 RX ORDER — LORAZEPAM 2 MG/ML
1 INJECTION INTRAMUSCULAR ONCE
Status: DISCONTINUED | OUTPATIENT
Start: 2020-01-14 | End: 2020-01-14

## 2020-01-14 RX ORDER — CIPROFLOXACIN 250 MG/1
250 TABLET, FILM COATED ORAL EVERY 12 HOURS SCHEDULED
Qty: 28 TABLET | Refills: 0 | Status: SHIPPED | OUTPATIENT
Start: 2020-01-14 | End: 2020-01-28

## 2020-01-14 RX ORDER — GREEN TEA/HOODIA GORDONII 315-12.5MG
1 CAPSULE ORAL 2 TIMES DAILY
Qty: 20 TABLET | Refills: 0 | Status: SHIPPED | OUTPATIENT
Start: 2020-01-14 | End: 2020-01-24

## 2020-01-14 RX ORDER — LINEZOLID 600 MG/1
600 TABLET, FILM COATED ORAL EVERY 12 HOURS SCHEDULED
Qty: 20 TABLET | Refills: 0 | Status: SHIPPED | OUTPATIENT
Start: 2020-01-14 | End: 2020-01-14

## 2020-01-14 RX ORDER — LINEZOLID 600 MG/1
600 TABLET, FILM COATED ORAL EVERY 12 HOURS SCHEDULED
Status: DISCONTINUED | OUTPATIENT
Start: 2020-01-14 | End: 2020-01-17 | Stop reason: HOSPADM

## 2020-01-14 RX ORDER — CIPROFLOXACIN 250 MG/1
250 TABLET, FILM COATED ORAL EVERY 12 HOURS SCHEDULED
Status: DISCONTINUED | OUTPATIENT
Start: 2020-01-14 | End: 2020-01-17 | Stop reason: HOSPADM

## 2020-01-14 RX ORDER — LINEZOLID 600 MG/1
600 TABLET, FILM COATED ORAL EVERY 12 HOURS SCHEDULED
Qty: 28 TABLET | Refills: 0 | Status: SHIPPED | OUTPATIENT
Start: 2020-01-14 | End: 2020-01-28

## 2020-01-14 RX ORDER — CIPROFLOXACIN 500 MG/1
500 TABLET, FILM COATED ORAL EVERY 12 HOURS SCHEDULED
Status: DISCONTINUED | OUTPATIENT
Start: 2020-01-14 | End: 2020-01-14

## 2020-01-14 RX ORDER — CIPROFLOXACIN 250 MG/1
250 TABLET, FILM COATED ORAL EVERY 12 HOURS SCHEDULED
Qty: 20 TABLET | Refills: 0 | Status: SHIPPED | OUTPATIENT
Start: 2020-01-14 | End: 2020-01-14

## 2020-01-14 RX ORDER — QUETIAPINE FUMARATE 100 MG/1
100 TABLET, FILM COATED ORAL NIGHTLY
Status: DISCONTINUED | OUTPATIENT
Start: 2020-01-14 | End: 2020-01-17 | Stop reason: HOSPADM

## 2020-01-14 RX ADMIN — PHENYTOIN SODIUM 200 MG: 100 CAPSULE ORAL at 21:28

## 2020-01-14 RX ADMIN — LINEZOLID 600 MG: 600 TABLET, FILM COATED ORAL at 21:28

## 2020-01-14 RX ADMIN — CLOPIDOGREL 75 MG: 75 TABLET, FILM COATED ORAL at 10:27

## 2020-01-14 RX ADMIN — CIPROFLOXACIN HYDROCHLORIDE 250 MG: 250 TABLET, FILM COATED ORAL at 21:28

## 2020-01-14 RX ADMIN — NEOMYCIN SULFATE, POLYMYXIN B SULFATE AND HYDROCORTISONE 3 DROP: 10; 3.5; 1 SUSPENSION/ DROPS AURICULAR (OTIC) at 10:37

## 2020-01-14 RX ADMIN — DEXAMETHASONE SODIUM PHOSPHATE 4 DROP: 1 SOLUTION/ DROPS OPHTHALMIC at 10:31

## 2020-01-14 RX ADMIN — LISINOPRIL 20 MG: 20 TABLET ORAL at 10:27

## 2020-01-14 RX ADMIN — NEOMYCIN SULFATE, POLYMYXIN B SULFATE AND HYDROCORTISONE 3 DROP: 10; 3.5; 1 SUSPENSION/ DROPS AURICULAR (OTIC) at 14:55

## 2020-01-14 RX ADMIN — QUETIAPINE FUMARATE 100 MG: 100 TABLET ORAL at 21:28

## 2020-01-14 RX ADMIN — NEOMYCIN SULFATE, POLYMYXIN B SULFATE AND HYDROCORTISONE 3 DROP: 10; 3.5; 1 SUSPENSION/ DROPS AURICULAR (OTIC) at 21:29

## 2020-01-14 RX ADMIN — ENOXAPARIN SODIUM 40 MG: 40 INJECTION SUBCUTANEOUS at 10:27

## 2020-01-14 RX ADMIN — DEXAMETHASONE SODIUM PHOSPHATE 4 DROP: 1 SOLUTION/ DROPS OPHTHALMIC at 21:28

## 2020-01-14 RX ADMIN — LORAZEPAM 1 MG: 1 TABLET ORAL at 16:03

## 2020-01-14 RX ADMIN — LEVOTHYROXINE SODIUM 125 MCG: 125 TABLET ORAL at 05:29

## 2020-01-14 ASSESSMENT — ENCOUNTER SYMPTOMS
CHOKING: 0
TROUBLE SWALLOWING: 0
NAUSEA: 0
SHORTNESS OF BREATH: 0
STRIDOR: 0
EYE REDNESS: 0
DIARRHEA: 0
PHOTOPHOBIA: 0
CHEST TIGHTNESS: 0
COUGH: 0
COLOR CHANGE: 0
RHINORRHEA: 0
ABDOMINAL PAIN: 0
FACIAL SWELLING: 0
BLOOD IN STOOL: 0
APNEA: 0
EYE DISCHARGE: 0

## 2020-01-14 ASSESSMENT — PAIN SCALES - GENERAL
PAINLEVEL_OUTOF10: 0

## 2020-01-14 ASSESSMENT — PAIN SCALES - PAIN ASSESSMENT IN ADVANCED DEMENTIA (PAINAD)
CONSOLABILITY: 0
TOTALSCORE: 0
BODYLANGUAGE: 0
NEGVOCALIZATION: 0
FACIALEXPRESSION: 0
BODYLANGUAGE: 0
NEGVOCALIZATION: 0
BREATHING: 0
NEGVOCALIZATION: 0
FACIALEXPRESSION: 0
BREATHING: 0
NEGVOCALIZATION: 0
BODYLANGUAGE: 0
BREATHING: 0
TOTALSCORE: 0
BODYLANGUAGE: 0
BODYLANGUAGE: 0
CONSOLABILITY: 0
FACIALEXPRESSION: 0
NEGVOCALIZATION: 0
CONSOLABILITY: 0
FACIALEXPRESSION: 0
TOTALSCORE: 0
CONSOLABILITY: 0
FACIALEXPRESSION: 0
BREATHING: 0
TOTALSCORE: 0
BODYLANGUAGE: 0
FACIALEXPRESSION: 0
CONSOLABILITY: 0
NEGVOCALIZATION: 0
NEGVOCALIZATION: 0
TOTALSCORE: 0
TOTALSCORE: 0
FACIALEXPRESSION: 0
NEGVOCALIZATION: 0
BODYLANGUAGE: 0
BODYLANGUAGE: 0
TOTALSCORE: 0
BREATHING: 0
FACIALEXPRESSION: 0
CONSOLABILITY: 0
BREATHING: 0
BODYLANGUAGE: 0
CONSOLABILITY: 0
TOTALSCORE: 0
TOTALSCORE: 0
BODYLANGUAGE: 0
BREATHING: 0
CONSOLABILITY: 0
CONSOLABILITY: 0
FACIALEXPRESSION: 0
CONSOLABILITY: 0
NEGVOCALIZATION: 0
FACIALEXPRESSION: 0
BREATHING: 0
TOTALSCORE: 0
NEGVOCALIZATION: 0

## 2020-01-14 NOTE — CARE COORDINATION
Referral made to [facility] for daughters, states they will call me with their third choice later. Referral sent right and hard fax ([phone]),  vm left for [facility] in admissions([phone]) that referral sent.    Chele Thakkar, RN, BSN  [phone]

## 2020-01-14 NOTE — PLAN OF CARE
Problem: Falls - Risk of:  Goal: Will remain free from falls  1/13/2020 2124 by Clarita Babin RN  Outcome: Ongoing  1/13/2020 1454 by Darian Pride RN  Outcome: Ongoing  Note:   Non-skid socks on. Bed in lowest position, wheels locked. Bed alarm on. Call light within reach. Bedside table within reach. Room closest to the nurses station.   Goal: Absence of physical injury  Outcome: Ongoing     Problem: Risk for Impaired Skin Integrity  Goal: Tissue integrity - skin and mucous membranes  Outcome: Ongoing     Problem: Urinary Elimination:  Goal: Signs and symptoms of infection will decrease  Outcome: Ongoing  Goal: Ability to reestablish a normal urinary elimination pattern will improve - after catheter removal  Outcome: Ongoing  Goal: Complications related to the disease process, condition or treatment will be avoided or minimized  Outcome: Ongoing

## 2020-01-14 NOTE — PROGRESS NOTES
Infectious Diseases   Progress Note      Admission Date: 1/11/2020  Hospital Day: Hospital Day: 4   Attending: Kim Kinney MD  Date of service: 1/14/2020     Chief complaint/ Reason for consult: The patient was seen today for the following:      · Complicated right ear infection with MRSA  · Complicated UTI with E. coli  · Acute metabolic encephalopathy  · Seizure disorder    Microbiology:        I have reviewed allavailable micro lab data and cultures    · Blood culture (2/2) - collected on 1/14/2020: Negative  · Right ear drainage culture - collectedon 1/12/2020: *MRSA. Staph aureus mrsa (1)     Antibiotic Interpretation MYLES Status    ciprofloxacin Sensitive <=0.5 mcg/mL     clindamycin Sensitive <=0.25 mcg/mL     erythromycin Resistant >=8 mcg/mL     oxacillin Resistant >=4 mcg/mL     tetracycline Sensitive <=1 mcg/mL     trimethoprim-sulfamethoxazole Sensitive <=10 mcg/mL     vancomycin Sensitive 1 mcg/mL           · Urine culture  - collected on 1/11/2020: Greater than 100,000 CFU per mL of E. coli    Escherichia coli (1)     Antibiotic Interpretation MYLES Status    ampicillin Sensitive 8 mcg/mL     ceFAZolin Sensitive <=4 mcg/mL      NOTE: Cefazolin should only be used for uncomplicated UTI        for E.coli or Klebsiella pneumoniae.    cefepime Sensitive <=0.12 mcg/mL     cefTRIAXone Sensitive <=0.25 mcg/mL     ciprofloxacin Sensitive <=0.25 mcg/mL     ertapenem Sensitive <=0.12 mcg/mL     gentamicin Sensitive <=1 mcg/mL     levofloxacin Sensitive <=0.12 mcg/mL     nitrofurantoin Sensitive <=16 mcg/mL     piperacillin-tazobactam Sensitive <=4 mcg/mL     trimethoprim-sulfamethoxazole Sensitive <=20 mcg/mL         Antibiotics and immunizations:       Current antibiotics: All antibiotics and their doses were reviewed by me    Recent Abx Admin                   vancomycin (VANCOCIN) 750 mg in dextrose 5 % 250 mL IVPB (mg) 750 mg New Bag 01/13/20 1618    cefTRIAXone (ROCEPHIN) 1 g in sterile water 10 mL IV syringe (g) 1 g Given 01/13/20 5161                  Immunization History: All immunization history was reviewed by me today. Immunization History   Administered Date(s) Administered    Influenza Virus Vaccine 10/07/2010, 01/03/2014, 11/30/2015    Influenza, High Dose (Fluzone 65 yrs and older) 11/21/2012, 01/17/2017, 09/11/2017, 10/24/2018    Influenza, Triv, inactivated, subunit, adjuvanted, IM (Fluad 65 yrs and older) 12/05/2019    Pneumococcal Conjugate 13-valent ([id number]) 01/27/2015    Pneumococcal Polysaccharide ([id number]) 11/13/1997    Td, unspecified formulation 09/25/1995    Tdap (Boostrix, Adacel) 04/10/2015       Known drug allergies: All allergies were reviewed and updated    Allergies   Allergen Reactions    Aspirin Other (See Comments)     unknown    Butalbital-Aspirin-Caffeine Other (See Comments)     unknown    Daypro [Oxaprozin] Other (See Comments)     unknown    Diclofenac Sodium Other (See Comments)     unknown    Erythromycin Swelling    Naprosyn [Naproxen]     Penicillins      Pt denies allergy to pcn    Prinivil [Lisinopril] Other (See Comments)     Cough     Sulfa Antibiotics Swelling       Social history:     Social History:  All social andepidemiologic history was reviewed and updated by me today as needed. · Tobacco use:   reports that she has never smoked. She has never used smokeless tobacco.  · Alcohol use:   reports no history of alcohol use. · Currently lives in: [facility]  ·  reports no history of drug use. Assessment:     The patient is a 80 y.o. old female who  has a past medical history of Arthritis, Cancer ([address] Hypercholesterolemia, Hypertension, Neuromuscular disorder ([address] Neuropathy, Psychiatric problem, Seizures ([address] Thyroid disease, and Unspecified cerebral artery occlusion with cerebral infarction (approximately 10 years ago).  with following problems:    · Complicated right ear infection with MRSA-currently on IV vancomycin  · Complicated UTI with E. Coli -has been on IV ceftriaxone  · Acute metabolic encephalopathy-slowly improving  · Seizure disorder  · History of stroke  · Bedbound  · Essential hypertension-BP controlled  · Need for contact isolation      Discussion:      The patient has lost IV access. She is currently on IV vancomycin and IV ceftriaxone for MRSA and E. coli coverage respectively. Susceptibilities now available. MRI of the brain reviewed. Shows no acute infarct. There is concern for secondary flow in the left vertebral artery. Versus partial thrombosis. Severe right mastoid effusion with opacification of the middle ear and external auditory canal noted. Likely from MRSA infection    Plan:     Diagnostic Workup:      · Continue to follow  fever curve, WBC count and blood cultures  · Follow up on liver and renal function    Antimicrobials:    · We will stop IV vancomycin today. We will start p.o. linezolid 600 mg every 12 hour for MRSA coverage   · Given her age and renal dysfunction, will use ciprofloxacin 250 mg every 12 hours  · Recommend a 14 day course of linezolid and ciprofloxacin  · As linezolid tends to cause thrombocytopenia, will order a CBC in 10 days. Results to be faxed to me at the number listed below  · Recommend probiotic twice daily while on antibiotics  · DVT prophylaxis  · Contact isolation for MRSA  · Discussed the above plan with patient and RN       Drug Monitoring:    · Continue monitoring for antibiotic toxicity as follows: CMP, QTc interval, CBC  · Continue to watch for following: new or worsening fever, new hypotension, hives, lip swelling and redness or purulence at vascular access sites. Current isolation precautions:    Currently active isolation(s): Contact     I/v access Management:    · Continue to monitor i.v access sites for erythema, induration,discharge or tenderness.    · As always, continue efforts to minimize tubes/ lines/ drains as clinically dysuria, frequency, hematuria, menstrual problem and vaginal discharge. Musculoskeletal: Negative for arthralgias, joint swelling, myalgias and neck stiffness. Skin: Negative for color change and rash. Allergic/Immunologic: Negative for immunocompromised state. Neurological: Negative for dizziness, seizures, speech difficulty, light-headedness and headaches. Hematological: Negative for adenopathy. Psychiatric/Behavioral: Negative for agitation, hallucinations and suicidal ideas. Past Medical History: All past medical history reviewed today. Past Medical History:   Diagnosis Date    Arthritis     Cancer [address]     skin on back    Hypercholesterolemia     Hypertension     Neuromuscular disorder ([facility]     peripheral neuropathy    Neuropathy     Psychiatric problem     dementia    Seizures (HCC)     had seizures at time of stroke    Thyroid disease     hypothyroidism    Unspecified cerebral artery occlusion with cerebral infarction approximately 10 years ago    slight left side weakness per patient as a result of stroke       Past Surgical History: All past surgical history was reviewed today. Past Surgical History:   Procedure Laterality Date    CATARACT REMOVAL Bilateral 2004    CHOLECYSTECTOMY      COLONOSCOPY      FEMUR FRACTURE SURGERY Right 07/17/2018    right hip gamma nail    FRACTURE SURGERY      left shoulder    HYSTERECTOMY      INNER EAR SURGERY Right 7/17/2015    ACTUAL PROCEDURE: MYRINGOTOMY BILATERAL PRESSURE EQUALIZING TUBE INSERTION RIGHT EAR     JOINT REPLACEMENT Left 12/12/09    knee    SKIN BIOPSY      and removal on back    THYROID SURGERY      TONSILLECTOMY         Family History: All family history was reviewed today.         Problem Relation Age of Onset    Asthma Daughter     Ulcerative Colitis Son     Heart Disease Son     Alcohol Abuse Daughter     Alcohol Abuse Son     Stroke Daughter        Objective:       PHYSICAL EXAM:      Vitals: Piggyback:50]  Out: -     Lab Data:   All available labs and old records have been reviewed by me. CBC:  Recent Labs     01/11/20 2058 01/13/20  0522   WBC 13.9* 8.2   RBC 2.79* 2.62*   HGB 9.5* 9.0*   HCT 28.9* 26.9*    274   .5* 102.6*   MCH 33.9 34.5*   MCHC 32.7 33.6   RDW 18.4* 18.8*        BMP:  Recent Labs     01/11/20 2057 01/13/20  0523    136   K 4.4 3.8    105   CO2 19* 20*   BUN 22* 12   CREATININE 1.1 0.9   CALCIUM 8.5 8.9   GLUCOSE 146* 116*        Hepatic Function Panel:   Lab Results   Component Value Date    ALKPHOS 191 01/11/2020    ALT 18 01/11/2020    AST 31 01/11/2020    PROT 7.1 01/11/2020    BILITOT 0.8 01/11/2020    BILIDIR <0.2 11/30/2015    IBILI see below 11/30/2015    LABALBU 3.8 01/11/2020       CPK:   Lab Results   Component Value Date    CKTOTAL 89 01/02/2014     ESR: No results found for: SEDRATE  CRP: No results found for: CRP        Imaging: All pertinent images and reports for the current visit were reviewed by me during this visit. MRI BRAIN WO CONTRAST    (Results Pending)       Medications: All current and past medications were reviewed.      QUEtiapine  12.5 mg Oral Once    neomycin-polymyxin-hydrocortisone  3 drop Right Ear TID    lisinopril  20 mg Oral Daily    LORazepam  1 mg Intravenous Once    vancomycin  750 mg Intravenous Q24H    cefTRIAXone (ROCEPHIN) IV  1 g Intravenous Q24H    clopidogrel  75 mg Oral Daily    levothyroxine  125 mcg Oral Daily    phenytoin  200 mg Oral Nightly    sodium chloride flush  10 mL Intravenous 2 times per day    enoxaparin  40 mg Subcutaneous Daily    dexamethasone  4 drop Right Ear BID           sodium chloride flush, ondansetron, menthol-zinc oxide      Problem list:       Patient Active Problem List   Diagnosis Code    Major depressive disorder with single episode, in full remission ([facility] F32.5    Essential hypertension I10    Hypothyroidism E03.9    Osteoarthritis M19.90    Chronic

## 2020-01-14 NOTE — CARE COORDINATION
Called [facility] and spoke to Sonny Lilly and referral received.   Ricky Munguia RN, BSN  [phone]

## 2020-01-14 NOTE — CARE COORDINATION
Called intake number at [facility] ([phone]) and left voice message inquiring on referral sent yesterday afternoon.   Edwina Reyna RN, BSN  [phone]

## 2020-01-14 NOTE — CARE COORDINATION
The Plan for Transition of Care is related to the following treatment goals: pt confused, daughters state their goal for mom is to \"take her back home. \"       The Patient and/or patient representative -daughters  was provided with a choice of provider and agrees   with the discharge plan. [x] Yes [] No    Freedom of choice list was provided with basic dialogue that supports the patient's individualized plan of care/goals, treatment preferences and shares the quality data associated with the providers.  [x] Yes [] No    Alesha Finley RN, BSN  [phone]

## 2020-01-14 NOTE — CARE COORDINATION
Patient is okay to be discharged from infectious disease standpoint, once cleared by primary service and other sub-specialties. My final orders are in the chart. Feel free to call me with questions, if needed.       Janel Pedro MD, MPH  1/14/2020, 3:44 PM  [facility] Infectious Disease   Office: [phone]  Fax: [phone]  Tuesday AM clinic:   [address] [address]  Thursday AM clinic: [facility]

## 2020-01-14 NOTE — PROGRESS NOTES
[facility]    1/14/2020 9:27 AM        Name: Roxanna Lewis .               Admitted: 1/11/2020  Primary Care Provider: Grant Nava MD (Tel: [phone])      Subjective:    Patient  Agitated this morning pulling out iv, having more visual hallucinations    Reviewed interval ancillary notes    Current Medications  QUEtiapine (SEROQUEL) tablet 12.5 mg, Once  neomycin-polymyxin-hydrocortisone (CORTISPORIN) otic suspension 3 drop, TID  lisinopril (PRINIVIL;ZESTRIL) tablet 20 mg, Daily  LORazepam (ATIVAN) injection 1 mg, Once  vancomycin (VANCOCIN) 750 mg in dextrose 5 % 250 mL IVPB, Q24H  cefTRIAXone (ROCEPHIN) 1 g in sterile water 10 mL IV syringe, Q24H  clopidogrel (PLAVIX) tablet 75 mg, Daily  levothyroxine (SYNTHROID) tablet 125 mcg, Daily  phenytoin (DILANTIN) ER capsule 200 mg, Nightly  sodium chloride flush 0.9 % injection 10 mL, 2 times per day  sodium chloride flush 0.9 % injection 10 mL, PRN  ondansetron (ZOFRAN) injection 4 mg, Q6H PRN  enoxaparin (LOVENOX) injection 40 mg, Daily  dexamethasone (DECADRON) 0.1 % ophthalmic solution 4 drop, BID  menthol-zinc oxide (CALMOSEPTINE) 0.44-20.6 % ointment, PRN        Objective:  BP (!) 180/76   Pulse 102   Temp 97.6 °F (36.4 °C) (Oral)   Resp 16   Ht 5' 7\" (1.702 m)   Wt 120 lb 8 oz (54.7 kg) Comment: w/overlay  SpO2 97%   BMI 18.87 kg/m²     Intake/Output Summary (Last 24 hours) at 1/14/2020 0927  Last data filed at 1/13/2020 1334  Gross per 24 hour   Intake 240 ml   Output --   Net 240 ml      Wt Readings from Last 3 Encounters:   01/14/20 120 lb 8 oz (54.7 kg)   11/18/19 135 lb (61.2 kg)   11/12/19 135 lb (61.2 kg)       General appearance:  Agitated oriented to place only  HEENT: atraumatic, Pupils equal, muscous membranes moist, no masses appreciated , right ear with no further drainage  Cardiovascular: Regular rate and rhythm no murmurs appreciated  Respiratory: CTAB no wheezing  Gastrointestinal: Abdomen soft, non-tender, BS+  EXT: no edema  Neurology: no gross focal deficts  Psychiatry:agitated having visual hallucinations  Skin: Warm, dry, no rashes appreciated    Labs and Tests:  CBC:   Recent Labs     01/11/20 2058 01/13/20  0522   WBC 13.9* 8.2   HGB 9.5* 9.0*    274     BMP:    Recent Labs     01/11/20 2057 01/13/20  0523    136   K 4.4 3.8    105   CO2 19* 20*   BUN 22* 12   CREATININE 1.1 0.9   GLUCOSE 146* 116*     Hepatic:   Recent Labs     01/11/20 2057   AST 31   ALT 18   BILITOT 0.8   ALKPHOS 191*     MRI BRAIN WO CONTRAST    (Results Pending)       Recent imaging reviewed    Problem List  Active Problems:    Seizure disorder (HCC)    Complicated UTI (urinary tract infection)    Otorrhea of right ear    MRSA (methicillin resistant Staphylococcus aureus) infection    Acute metabolic encephalopathy    E coli infection    History of arterial ischemic stroke    Bedbound    Infection requiring contact isolation precautions  Resolved Problems:    * No resolved hospital problems.  *       Assessment & Plan:   Visual hallucinations: will get mri to r/o organic cause looked at prev pcp notes has been happening at home  - ativan 1mg iv for mri, olanzapine 2.5mg if needed for agitation  - if mri neg psych consult    Acute complicated  E coli UTI  - rocephin day 3/5    Acute otitis media with spontaneous perforation of the right tympanic membrane and serous otitis media of the left ear, MRSA  - vanc started day 2 , if bc negative will change to oral regimen      Dehydration: elevated bun  - resolved    Stress hyperglycemia: a1c of 5.0    Macrocytic anemia: elevated b12, iron sat normal     HTN: home meds    Hypothyroidism: home meds    Diet: DIET GENERAL;  Code:Full Code  DVT PPXlovenox  Disposition pending bc negative      Chet Hernandez MD   1/14/2020 9:27 AM

## 2020-01-14 NOTE — PROGRESS NOTES
non-tender, BS+  EXT: no edema  Neurology: no gross focal deficts  Psychiatry: Appropriate affect. Not agitated  Skin: Warm, dry, no rashes appreciated    Labs and Tests:  CBC:   Recent Labs     01/11/20 2058 01/13/20  0522   WBC 13.9* 8.2   HGB 9.5* 9.0*    274     BMP:    Recent Labs     01/11/20 2057 01/13/20  0523    136   K 4.4 3.8    105   CO2 19* 20*   BUN 22* 12   CREATININE 1.1 0.9   GLUCOSE 146* 116*     Hepatic:   Recent Labs     01/11/20 2057   AST 31   ALT 18   BILITOT 0.8   ALKPHOS 191*     No orders to display       Recent imaging reviewed    Problem List  Active Problems:    Seizure disorder ([address]    Complicated UTI (urinary tract infection)    Otorrhea of right ear    MRSA (methicillin resistant Staphylococcus aureus) infection    Acute metabolic encephalopathy    E coli infection    History of arterial ischemic stroke    Bedbound    Infection requiring contact isolation precautions  Resolved Problems:    * No resolved hospital problems.  *       Assessment & Plan:   Acute complicated UTI  - rocephin day 2 cultures pending    Acute otitis media with spontaneous perforation of the right tympanic membrane and serous otitis media of the left ear, MRSA  - vanc started day 1, final culture pending  - id consulted  - ear drops on right per ENT, appreciate recs    Dehydration: elevated bun  - resolved    Stress hyperglycemia: a1c of 5.0    Macrocytic anemia: elevated b12, iron sat normal     HTN: home meds    Hypothyroidism: home meds    Diet: DIET GENERAL;  Code:Full Code  DVT PPXlovenox  Disposition pending bc negative, and snf placement      Roxy Macdonald MD   1/13/2020 7:34 PM

## 2020-01-14 NOTE — PROGRESS NOTES
Evening medications given, assessment completed, patient denies pain or any unmet needs. Resting in bed, watching television, call light within reach, will continue to monitor.        01/13/20 2000   Vital Signs   Temp 97.8 °F (36.6 °C)   Temp Source Temporal   Pulse 77   Heart Rate Source Monitor   Resp 16   BP (!) 152/56   BP Location Left upper arm   BP Upper/Lower Upper   Patient Position Semi fowlers   Level of Consciousness 0   MEWS Score 1   Patient Currently in Pain Denies   Pain Assessment   Pain Assessment Advanced Dementia   Pain Level 0   PAINAD (Pain Assessment in Advance Dementia)   Breathing 0   Negative Vocalization 0   Facial Expression 0   Body Language 0   Consolability 0   PAINAD Score 0   Oxygen Therapy   SpO2 98 %   Pulse Oximeter Device Mode Intermittent   Pulse Oximeter Device Location Finger   O2 Device None (Room air)

## 2020-01-15 ENCOUNTER — APPOINTMENT (OUTPATIENT)
Dept: CT IMAGING | Age: 85
DRG: 689 | End: 2020-01-15
Payer: MEDICARE

## 2020-01-15 LAB
ANION GAP SERPL CALCULATED.3IONS-SCNC: 13 MMOL/L (ref 3–16)
ANISOCYTOSIS: ABNORMAL
BASOPHILS ABSOLUTE: 0.1 K/UL (ref 0–0.2)
BASOPHILS RELATIVE PERCENT: 0.9 %
BUN BLDV-MCNC: 9 MG/DL (ref 7–20)
CALCIUM SERPL-MCNC: 8.7 MG/DL (ref 8.3–10.6)
CHLORIDE BLD-SCNC: 103 MMOL/L (ref 99–110)
CO2: 19 MMOL/L (ref 21–32)
CREAT SERPL-MCNC: 0.7 MG/DL (ref 0.6–1.2)
EOSINOPHILS ABSOLUTE: 0.1 K/UL (ref 0–0.6)
EOSINOPHILS RELATIVE PERCENT: 1.7 %
GFR AFRICAN AMERICAN: >60
GFR NON-AFRICAN AMERICAN: >60
GLUCOSE BLD-MCNC: 95 MG/DL (ref 70–99)
HCT VFR BLD CALC: 29.7 % (ref 36–48)
HEMOGLOBIN: 9.8 G/DL (ref 12–16)
HYPOCHROMIA: ABNORMAL
LYMPHOCYTES ABSOLUTE: 1.4 K/UL (ref 1–5.1)
LYMPHOCYTES RELATIVE PERCENT: 21.2 %
MACROCYTES: ABNORMAL
MCH RBC QN AUTO: 34.4 PG (ref 26–34)
MCHC RBC AUTO-ENTMCNC: 33.1 G/DL (ref 31–36)
MCV RBC AUTO: 104 FL (ref 80–100)
MONOCYTES ABSOLUTE: 0.7 K/UL (ref 0–1.3)
MONOCYTES RELATIVE PERCENT: 10.6 %
NEUTROPHILS ABSOLUTE: 4.2 K/UL (ref 1.7–7.7)
NEUTROPHILS RELATIVE PERCENT: 65.6 %
PDW BLD-RTO: 17.7 % (ref 12.4–15.4)
PLATELET # BLD: 310 K/UL (ref 135–450)
PLATELET SLIDE REVIEW: ADEQUATE
PMV BLD AUTO: 8.1 FL (ref 5–10.5)
POLYCHROMASIA: ABNORMAL
POTASSIUM SERPL-SCNC: 4.4 MMOL/L (ref 3.5–5.1)
RBC # BLD: 2.85 M/UL (ref 4–5.2)
SCHISTOCYTES: ABNORMAL
SLIDE REVIEW: ABNORMAL
SODIUM BLD-SCNC: 135 MMOL/L (ref 136–145)
WBC # BLD: 6.4 K/UL (ref 4–11)

## 2020-01-15 PROCEDURE — 92610 EVALUATE SWALLOWING FUNCTION: CPT

## 2020-01-15 PROCEDURE — 92526 ORAL FUNCTION THERAPY: CPT

## 2020-01-15 PROCEDURE — 6370000000 HC RX 637 (ALT 250 FOR IP): Performed by: INTERNAL MEDICINE

## 2020-01-15 PROCEDURE — 1200000000 HC SEMI PRIVATE

## 2020-01-15 PROCEDURE — 6360000002 HC RX W HCPCS: Performed by: INTERNAL MEDICINE

## 2020-01-15 PROCEDURE — 99232 SBSQ HOSP IP/OBS MODERATE 35: CPT | Performed by: INTERNAL MEDICINE

## 2020-01-15 PROCEDURE — 6360000004 HC RX CONTRAST MEDICATION: Performed by: OTOLARYNGOLOGY

## 2020-01-15 PROCEDURE — 2580000003 HC RX 258: Performed by: INTERNAL MEDICINE

## 2020-01-15 PROCEDURE — 85025 COMPLETE CBC W/AUTO DIFF WBC: CPT

## 2020-01-15 PROCEDURE — 36415 COLL VENOUS BLD VENIPUNCTURE: CPT

## 2020-01-15 PROCEDURE — 80048 BASIC METABOLIC PNL TOTAL CA: CPT

## 2020-01-15 RX ADMIN — LINEZOLID 600 MG: 600 TABLET, FILM COATED ORAL at 10:25

## 2020-01-15 RX ADMIN — LINEZOLID 600 MG: 600 TABLET, FILM COATED ORAL at 20:43

## 2020-01-15 RX ADMIN — NEOMYCIN SULFATE, POLYMYXIN B SULFATE AND HYDROCORTISONE 3 DROP: 10; 3.5; 1 SUSPENSION/ DROPS AURICULAR (OTIC) at 10:26

## 2020-01-15 RX ADMIN — IOPAMIDOL 75 ML: 755 INJECTION, SOLUTION INTRAVENOUS at 11:05

## 2020-01-15 RX ADMIN — ENOXAPARIN SODIUM 40 MG: 40 INJECTION SUBCUTANEOUS at 10:25

## 2020-01-15 RX ADMIN — CIPROFLOXACIN HYDROCHLORIDE 250 MG: 250 TABLET, FILM COATED ORAL at 10:25

## 2020-01-15 RX ADMIN — CLOPIDOGREL 75 MG: 75 TABLET, FILM COATED ORAL at 10:25

## 2020-01-15 RX ADMIN — NEOMYCIN SULFATE, POLYMYXIN B SULFATE AND HYDROCORTISONE 3 DROP: 10; 3.5; 1 SUSPENSION/ DROPS AURICULAR (OTIC) at 20:43

## 2020-01-15 RX ADMIN — QUETIAPINE FUMARATE 100 MG: 100 TABLET ORAL at 20:43

## 2020-01-15 RX ADMIN — ANORECTAL OINTMENT: 15.7; .44; 24; 20.6 OINTMENT TOPICAL at 20:43

## 2020-01-15 RX ADMIN — LISINOPRIL 20 MG: 20 TABLET ORAL at 10:29

## 2020-01-15 RX ADMIN — DEXAMETHASONE SODIUM PHOSPHATE 4 DROP: 1 SOLUTION/ DROPS OPHTHALMIC at 10:27

## 2020-01-15 RX ADMIN — DEXAMETHASONE SODIUM PHOSPHATE 4 DROP: 1 SOLUTION/ DROPS OPHTHALMIC at 20:43

## 2020-01-15 RX ADMIN — LEVOTHYROXINE SODIUM 125 MCG: 125 TABLET ORAL at 05:52

## 2020-01-15 RX ADMIN — Medication 10 ML: at 10:26

## 2020-01-15 RX ADMIN — PHENYTOIN SODIUM 200 MG: 100 CAPSULE ORAL at 20:43

## 2020-01-15 RX ADMIN — CIPROFLOXACIN HYDROCHLORIDE 250 MG: 250 TABLET, FILM COATED ORAL at 20:43

## 2020-01-15 RX ADMIN — NEOMYCIN SULFATE, POLYMYXIN B SULFATE AND HYDROCORTISONE 3 DROP: 10; 3.5; 1 SUSPENSION/ DROPS AURICULAR (OTIC) at 16:00

## 2020-01-15 ASSESSMENT — ENCOUNTER SYMPTOMS
CHEST TIGHTNESS: 0
NAUSEA: 0
APNEA: 0
STRIDOR: 0
SHORTNESS OF BREATH: 0
PHOTOPHOBIA: 0
ABDOMINAL PAIN: 0
COLOR CHANGE: 0
TROUBLE SWALLOWING: 0
EYE DISCHARGE: 0
DIARRHEA: 0
COUGH: 0
EYE REDNESS: 0
FACIAL SWELLING: 0
CHOKING: 0
BLOOD IN STOOL: 0
RHINORRHEA: 0

## 2020-01-15 ASSESSMENT — PAIN SCALES - GENERAL
PAINLEVEL_OUTOF10: 0

## 2020-01-15 ASSESSMENT — PAIN SCALES - PAIN ASSESSMENT IN ADVANCED DEMENTIA (PAINAD)
TOTALSCORE: 0
BREATHING: 0
BODYLANGUAGE: 0
BREATHING: 0
FACIALEXPRESSION: 0
BREATHING: 0
CONSOLABILITY: 0
FACIALEXPRESSION: 0
FACIALEXPRESSION: 0
CONSOLABILITY: 0
NEGVOCALIZATION: 0
CONSOLABILITY: 0
FACIALEXPRESSION: 0
CONSOLABILITY: 0
TOTALSCORE: 0
BODYLANGUAGE: 0
NEGVOCALIZATION: 0
BREATHING: 0
NEGVOCALIZATION: 0
TOTALSCORE: 0
TOTALSCORE: 0
NEGVOCALIZATION: 0

## 2020-01-15 NOTE — PROGRESS NOTES
Pt is at risk for aspiration based on reduced bolus control, delayed swallow initiation, and suspected reduced airway protection. Recommend use of aspiration precautions. Dietary Recommendations: Dysphagia II Minced and Moist with thin liquids, no straws, meds in puree    Strategies: 90 degree positioning with all p.o. intake; small bites/sips; alternate textures through meal; reduce rate of intake    Treatment/Goals: Speech therapy for dysphagia tx 3-5 times per week. ST.) Pt will tolerate recommended diet without s/s of aspiration   2.) If clinical symptoms of penetration/aspiration continue to be noted,Pt will tolerate MBS to r/o aspiration and determine appropriate diet/liquid level. 3.) Pt will tolerate diet advance to least restricted diet, as clinically indicated, with no signs of aspiration   4.) Pt will improve oral motor function via bolus control exercises 5/5    Oral motor Exam:  Dentition: upper dentures  Labial/Facial: reduced strength, ROM, and coordination  Lingual: reduced strength, ROM, and coordination  Voice: within functional limits     Oral Phase:   Reduced bolus control  Reduced/prolonged mastication  Reduced A-P propulsion  Lingual pumping  Apparent premature bolus loss to pharynx    Pharyngeal Phase:  Apparent pharyngeal pooling  Delayed swallow initiation  Decreased laryngeal elevation via palpation   Suspected decreased pharyngeal clearing     Patient/Family Education:Education, results and recommendations given to the Pt and nurse, who verbalized understanding    Timed Code Treatment: 0 minutes    Total Treatment Time: 23 minutes    Discharge Recommendations: Speech Therapy for Speech/Dysphagia treatment at discharge.     Esau Tolbert M.A., [address] [place] [address]  Speech-Language Pathologist

## 2020-01-15 NOTE — PROGRESS NOTES
[facility]    1/15/2020 12:59 PM        Name: Lucy Khan               Admitted: 1/11/2020  Primary Care Provider: Maya Joaquin MD (Tel: [phone])      Subjective:    Patient  Lying in bed agitation is better no chest pain or sob  Reviewed interval ancillary notes    Current Medications  neomycin-polymyxin-hydrocortisone (CORTISPORIN) otic suspension 3 drop, TID  lisinopril (PRINIVIL;ZESTRIL) tablet 20 mg, Daily  linezolid (ZYVOX) tablet 600 mg, 2 times per day  ciprofloxacin (CIPRO) tablet 250 mg, 2 times per day  QUEtiapine (SEROQUEL) tablet 100 mg, Nightly  clopidogrel (PLAVIX) tablet 75 mg, Daily  levothyroxine (SYNTHROID) tablet 125 mcg, Daily  phenytoin (DILANTIN) ER capsule 200 mg, Nightly  sodium chloride flush 0.9 % injection 10 mL, 2 times per day  sodium chloride flush 0.9 % injection 10 mL, PRN  ondansetron (ZOFRAN) injection 4 mg, Q6H PRN  enoxaparin (LOVENOX) injection 40 mg, Daily  dexamethasone (DECADRON) 0.1 % ophthalmic solution 4 drop, BID  menthol-zinc oxide (CALMOSEPTINE) 0.44-20.6 % ointment, PRN        Objective:  BP (!) 161/82   Pulse 81   Temp 98.2 °F (36.8 °C) (Temporal)   Resp 16   Ht 5' 7\" (1.702 m)   Wt 114 lb 3.2 oz (51.8 kg) Comment: overlay, no pump, 2 pillows, sheet, hob flat  SpO2 96%   BMI 17.89 kg/m²     Intake/Output Summary (Last 24 hours) at 1/15/2020 1259  Last data filed at 1/15/2020 1230  Gross per 24 hour   Intake 960 ml   Output --   Net 960 ml      Wt Readings from Last 3 Encounters:   01/15/20 114 lb 3.2 oz (51.8 kg)   11/18/19 135 lb (61.2 kg)   11/12/19 135 lb (61.2 kg)       General appearance:  Calm aaox3  HEENT: atraumatic, Pupils equal, muscous membranes moist, no masses appreciated , right ear with no further drainage  Cardiovascular: Regular rate and rhythm no murmurs appreciated  Respiratory: CTAB no wheezing  Gastrointestinal: Abdomen soft, non-tender, BS+  EXT: no edema  Neurology: no gross focal deficts  Psychiatry:calm  Skin: Warm, dry, no rashes appreciated    Labs and Tests:  CBC:   Recent Labs     01/13/20  0522 01/15/20  0531   WBC 8.2 6.4   HGB 9.0* 9.8*    310     BMP:    Recent Labs     01/13/20  0523 01/15/20  0531    135*   K 3.8 4.4    103   CO2 20* 19*   BUN 12 9   CREATININE 0.9 0.7   GLUCOSE 116* 95     Hepatic:   No results for input(s): AST, ALT, ALB, BILITOT, ALKPHOS in the last 72 hours. CTA NECK W CONTRAST         MRI BRAIN WO CONTRAST   Final Result   No acute infarct. Partial loss of normal signal void within the distal left vertebral artery   which may indicate slow flow versus partial thrombosis. Severe right mastoid effusion with opacification of the middle ear and EAC. Correlate for infection. Recent imaging reviewed    Problem List  Active Problems:    Seizure disorder ([address]    Complicated UTI (urinary tract infection)    Otorrhea of right ear    MRSA (methicillin resistant Staphylococcus aureus) infection    Acute metabolic encephalopathy    E coli infection    History of arterial ischemic stroke    Bedbound    Infection requiring contact isolation precautions    Encounter for medication counseling  Resolved Problems:    * No resolved hospital problems. *       Assessment & Plan:   Visual hallucinations: will get mri to r/o organic cause looked at prev pcp notes has been happening at home  -MRI neg, improve on seroquel,   - MRI did have ?  Left verterbral artery thriombosis diffciult iv access for CTA will get neuro input if cta neck necessary    Acute complicated  E coli UTI  - cipro day 4/5    Acute otitis media with spontaneous perforation of the right tympanic membrane and serous otitis media of the left ear, MRSA  -linezolid oral day 1/10      Dehydration: elevated bun  - resolved    Stress hyperglycemia: a1c of 5.0    Macrocytic anemia: elevated b12, iron sat normal     HTN:

## 2020-01-15 NOTE — CARE COORDINATION
[facility] referral right faxed and EKTA Persaud sending hard fax of demographics.   Ramirez Murrieta RN, BSN  [phone]

## 2020-01-15 NOTE — PROGRESS NOTES
Shift assessment complete, VSS, pt denies pain. Pt attempting to climb oob, unable to follow directions well, poor attention span, confused, only oriented to self. Pt repositioned with pillows, unable to keep heels elevated. Camera in room for safety, room near nurses station, call light within reach, non skid socks on. Pt was able to take HS medications. Will continue to monitor.

## 2020-01-15 NOTE — PROGRESS NOTES
mg Given 01/15/20 1025     600 mg Given 01/14/20 0548                  Immunization History: All immunization history was reviewed by me today. Immunization History   Administered Date(s) Administered    Influenza Virus Vaccine 10/07/2010, 01/03/2014, 11/30/2015    Influenza, High Dose (Fluzone 65 yrs and older) 11/21/2012, 01/17/2017, 09/11/2017, 10/24/2018    Influenza, Triv, inactivated, subunit, adjuvanted, IM (Fluad 65 yrs and older) 12/05/2019    Pneumococcal Conjugate 13-valent ([id number]) 01/27/2015    Pneumococcal Polysaccharide ([id number]) 11/13/1997    Td, unspecified formulation 09/25/1995    Tdap (Boostrix, Adacel) 04/10/2015       Known drug allergies: All allergies were reviewed and updated    Allergies   Allergen Reactions    Aspirin Other (See Comments)     unknown    Butalbital-Aspirin-Caffeine Other (See Comments)     unknown    Daypro [Oxaprozin] Other (See Comments)     unknown    Diclofenac Sodium Other (See Comments)     unknown    Erythromycin Swelling    Naprosyn [Naproxen]     Penicillins      Pt denies allergy to pcn    Prinivil [Lisinopril] Other (See Comments)     Cough     Sulfa Antibiotics Swelling       Social history:     Social History:  All social andepidemiologic history was reviewed and updated by me today as needed. · Tobacco use:   reports that she has never smoked. She has never used smokeless tobacco.  · Alcohol use:   reports no history of alcohol use. · Currently lives in: [facility]  ·  reports no history of drug use. Assessment:     The patient is a 80 y.o. old female who  has a past medical history of Arthritis, Cancer ([address] Hypercholesterolemia, Hypertension, Neuromuscular disorder ([address] Neuropathy, Psychiatric problem, Seizures ([address] Thyroid disease, and Unspecified cerebral artery occlusion with cerebral infarction (approximately 10 years ago).  with following problems:    · Complicated right ear infection with MRSA-now on oral linezolid  · Complicated UTI with E. Coli -covered with oral ciprofloxacin  · Acute metabolic encephalopathy-disease slowly improving  · Seizure disorder  · History of stroke  · Bedbound  · Essential hypertension-blood pressure okay  · Need for contact isolation      Discussion:      I had switched her antibiotics to p.o. linezolid and p.o. ciprofloxacin yesterday. She is tolerating antibiotics okay. She is afebrile. Plan:     Diagnostic Workup:    · Continue to follow  fever curve, WBC count and blood cultures  · Follow up on *liver and renal function  · Follow-up on CT angiogram of the neck    Antimicrobials:    · Continue p.o. linezolid 600 mg every 12 hour for 2 weeks after discharge  · Continue p.o. ciprofloxacin 500 mg every 12 hour for 2 weeks after discharge  · Have ordered a CBC to be done in 10 days to make sure he does not develop any thrombocytopenia with linezolid  · Discussed the importance of antibiotic compliance  · Contact isolation for MRSA while in the hospital  · Recommend following up with ENT as an outpatient  · DVT prophylaxis  · Recommend taking probiotic twice daily while on ciprofloxacin. Continue to watch for new fever no diarrhea for discharge  · Discussed all above with patient and RN      Drug Monitoring:    · Continue monitoring for antibiotic toxicity as follows: CBC, CMP, QTc interval  · Continue to watch for following: new or worsening fever, new hypotension, hives, lip swelling and redness or purulence at vascular access sites. I/v access Management:    · Continue to monitor i.v access sites for erythema, induration, discharge or tenderness. · As always, continue efforts to minimize tubes/lines/drains as clinically appropriate to reduce chances of line associated infections.     Patient education and counseling:      · The patient was educated in detail about the side-effects of various antibiotics and things to watch for like new rashes, lip swelling, severe state.   Neurological: Negative for dizziness, seizures, speech difficulty, light-headedness and headaches. Hematological: Negative for adenopathy. Psychiatric/Behavioral: Negative for agitation, hallucinations and suicidal ideas. Past Medical History: All past medical history reviewed today. Past Medical History:   Diagnosis Date    Arthritis     Cancer ([facility]     skin on back    Hypercholesterolemia     Hypertension     Neuromuscular disorder ([facility]     peripheral neuropathy    Neuropathy     Psychiatric problem     dementia    Seizures (HCC)     had seizures at time of stroke    Thyroid disease     hypothyroidism    Unspecified cerebral artery occlusion with cerebral infarction approximately 10 years ago    slight left side weakness per patient as a result of stroke       Past Surgical History: All past surgical history was reviewed today. Past Surgical History:   Procedure Laterality Date    CATARACT REMOVAL Bilateral 2004    CHOLECYSTECTOMY      COLONOSCOPY      FEMUR FRACTURE SURGERY Right 07/17/2018    right hip gamma nail    FRACTURE SURGERY      left shoulder    HYSTERECTOMY      INNER EAR SURGERY Right 7/17/2015    ACTUAL PROCEDURE: MYRINGOTOMY BILATERAL PRESSURE EQUALIZING TUBE INSERTION RIGHT EAR     JOINT REPLACEMENT Left 12/12/09    knee    SKIN BIOPSY      and removal on back    THYROID SURGERY      TONSILLECTOMY         Family History: All family history was reviewed today.         Problem Relation Age of Onset    Asthma Daughter     Ulcerative Colitis Son     Heart Disease Son     Alcohol Abuse Daughter     Alcohol Abuse Son     Stroke Daughter        Objective:       PHYSICAL EXAM:      Vitals:   Vitals:    01/14/20 2000 01/15/20 0030 01/15/20 0545 01/15/20 0839   BP: (!) 170/84 (!) 173/89 (!) 178/85 (!) 160/83   Pulse: 94 99 89 84   Resp: 18 16 16 16   Temp: 97.7 °F (36.5 °C) 97.5 °F (36.4 °C) 98.1 °F (36.7 °C) 98.2 °F (36.8 °C)   TempSrc: Temporal 104.0*   MCH 34.5* 34.4*   MCHC 33.6 33.1   RDW 18.8* 17.7*        BMP:  Recent Labs     01/13/20  0523 01/15/20  0531    135*   K 3.8 4.4    103   CO2 20* 19*   BUN 12 9   CREATININE 0.9 0.7   CALCIUM 8.9 8.7   GLUCOSE 116* 95        Hepatic Function Panel:   Lab Results   Component Value Date    ALKPHOS 191 01/11/2020    ALT 18 01/11/2020    AST 31 01/11/2020    PROT 7.1 01/11/2020    BILITOT 0.8 01/11/2020    BILIDIR <0.2 11/30/2015    IBILI see below 11/30/2015    LABALBU 3.8 01/11/2020       CPK:   Lab Results   Component Value Date    CKTOTAL 89 01/02/2014     ESR: No results found for: SEDRATE  CRP: No results found for: CRP        Imaging: All pertinent images and reports for the current visit were reviewed by me during this visit. MRI BRAIN WO CONTRAST   Final Result   No acute infarct. Partial loss of normal signal void within the distal left vertebral artery   which may indicate slow flow versus partial thrombosis. Severe right mastoid effusion with opacification of the middle ear and EAC. Correlate for infection. CTA NECK W CONTRAST    (Results Pending)       Medications: All current and past medications were reviewed.      neomycin-polymyxin-hydrocortisone  3 drop Right Ear TID    lisinopril  20 mg Oral Daily    linezolid  600 mg Oral 2 times per day    ciprofloxacin  250 mg Oral 2 times per day    QUEtiapine  100 mg Oral Nightly    clopidogrel  75 mg Oral Daily    levothyroxine  125 mcg Oral Daily    phenytoin  200 mg Oral Nightly    sodium chloride flush  10 mL Intravenous 2 times per day    enoxaparin  40 mg Subcutaneous Daily    dexamethasone  4 drop Right Ear BID           sodium chloride flush, ondansetron, menthol-zinc oxide      Problem list:       Patient Active Problem List   Diagnosis Code    Major depressive disorder with single episode, in full remission ([facility] F32.5    Essential hypertension I10    Hypothyroidism E03.9    Osteoarthritis

## 2020-01-15 NOTE — CARE COORDINATION
Order faxed to [phone] to Viri with [facility].    Lauren Alegria RN, BSN  [phone]

## 2020-01-16 LAB
PHENYTOIN DOSE AMOUNT: ABNORMAL
PHENYTOIN LEVEL: 8.3 UG/ML (ref 10–20)

## 2020-01-16 PROCEDURE — 6370000000 HC RX 637 (ALT 250 FOR IP): Performed by: INTERNAL MEDICINE

## 2020-01-16 PROCEDURE — 97535 SELF CARE MNGMENT TRAINING: CPT

## 2020-01-16 PROCEDURE — 99232 SBSQ HOSP IP/OBS MODERATE 35: CPT | Performed by: INTERNAL MEDICINE

## 2020-01-16 PROCEDURE — 1200000000 HC SEMI PRIVATE

## 2020-01-16 PROCEDURE — 97530 THERAPEUTIC ACTIVITIES: CPT

## 2020-01-16 PROCEDURE — 80185 ASSAY OF PHENYTOIN TOTAL: CPT

## 2020-01-16 PROCEDURE — 6360000002 HC RX W HCPCS: Performed by: INTERNAL MEDICINE

## 2020-01-16 PROCEDURE — 99223 1ST HOSP IP/OBS HIGH 75: CPT | Performed by: PSYCHIATRY & NEUROLOGY

## 2020-01-16 PROCEDURE — 36415 COLL VENOUS BLD VENIPUNCTURE: CPT

## 2020-01-16 PROCEDURE — 93880 EXTRACRANIAL BILAT STUDY: CPT

## 2020-01-16 RX ORDER — METRONIDAZOLE 500 MG/1
500 TABLET ORAL EVERY 8 HOURS SCHEDULED
Qty: 30 TABLET | Refills: 0 | Status: SHIPPED | OUTPATIENT
Start: 2020-01-16 | End: 2020-01-16

## 2020-01-16 RX ORDER — METRONIDAZOLE 500 MG/1
500 TABLET ORAL EVERY 8 HOURS SCHEDULED
Qty: 42 TABLET | Refills: 0 | Status: SHIPPED | OUTPATIENT
Start: 2020-01-16 | End: 2020-01-30

## 2020-01-16 RX ORDER — METRONIDAZOLE 250 MG/1
500 TABLET ORAL EVERY 8 HOURS SCHEDULED
Status: DISCONTINUED | OUTPATIENT
Start: 2020-01-16 | End: 2020-01-17 | Stop reason: HOSPADM

## 2020-01-16 RX ADMIN — METRONIDAZOLE 500 MG: 250 TABLET, FILM COATED ORAL at 17:06

## 2020-01-16 RX ADMIN — CIPROFLOXACIN HYDROCHLORIDE 250 MG: 250 TABLET, FILM COATED ORAL at 10:09

## 2020-01-16 RX ADMIN — ENOXAPARIN SODIUM 40 MG: 40 INJECTION SUBCUTANEOUS at 10:09

## 2020-01-16 RX ADMIN — LEVOTHYROXINE SODIUM 125 MCG: 125 TABLET ORAL at 06:11

## 2020-01-16 RX ADMIN — LINEZOLID 600 MG: 600 TABLET, FILM COATED ORAL at 10:09

## 2020-01-16 RX ADMIN — LISINOPRIL 20 MG: 20 TABLET ORAL at 10:09

## 2020-01-16 RX ADMIN — DEXAMETHASONE SODIUM PHOSPHATE 4 DROP: 1 SOLUTION/ DROPS OPHTHALMIC at 10:10

## 2020-01-16 RX ADMIN — NEOMYCIN SULFATE, POLYMYXIN B SULFATE AND HYDROCORTISONE 3 DROP: 10; 3.5; 1 SUSPENSION/ DROPS AURICULAR (OTIC) at 20:04

## 2020-01-16 RX ADMIN — LINEZOLID 600 MG: 600 TABLET, FILM COATED ORAL at 20:03

## 2020-01-16 RX ADMIN — NEOMYCIN SULFATE, POLYMYXIN B SULFATE AND HYDROCORTISONE 3 DROP: 10; 3.5; 1 SUSPENSION/ DROPS AURICULAR (OTIC) at 10:10

## 2020-01-16 RX ADMIN — CLOPIDOGREL 75 MG: 75 TABLET, FILM COATED ORAL at 10:09

## 2020-01-16 RX ADMIN — CIPROFLOXACIN HYDROCHLORIDE 250 MG: 250 TABLET, FILM COATED ORAL at 20:03

## 2020-01-16 RX ADMIN — PHENYTOIN SODIUM 200 MG: 100 CAPSULE ORAL at 20:03

## 2020-01-16 RX ADMIN — DEXAMETHASONE SODIUM PHOSPHATE 4 DROP: 1 SOLUTION/ DROPS OPHTHALMIC at 20:04

## 2020-01-16 RX ADMIN — QUETIAPINE FUMARATE 100 MG: 100 TABLET ORAL at 20:03

## 2020-01-16 RX ADMIN — NEOMYCIN SULFATE, POLYMYXIN B SULFATE AND HYDROCORTISONE 3 DROP: 10; 3.5; 1 SUSPENSION/ DROPS AURICULAR (OTIC) at 13:34

## 2020-01-16 RX ADMIN — METRONIDAZOLE 500 MG: 250 TABLET, FILM COATED ORAL at 21:22

## 2020-01-16 ASSESSMENT — ENCOUNTER SYMPTOMS
SHORTNESS OF BREATH: 0
NAUSEA: 0
COLOR CHANGE: 0
TROUBLE SWALLOWING: 0
APNEA: 0
PHOTOPHOBIA: 0
STRIDOR: 0
COUGH: 0
EYE REDNESS: 0
EYE DISCHARGE: 0
ABDOMINAL PAIN: 0
RHINORRHEA: 0
FACIAL SWELLING: 0
BLOOD IN STOOL: 0
CHEST TIGHTNESS: 0
CHOKING: 0
DIARRHEA: 0

## 2020-01-16 ASSESSMENT — PAIN SCALES - GENERAL
PAINLEVEL_OUTOF10: 0
PAINLEVEL_OUTOF10: 4
PAINLEVEL_OUTOF10: 0

## 2020-01-16 ASSESSMENT — PAIN SCALES - PAIN ASSESSMENT IN ADVANCED DEMENTIA (PAINAD)
NEGVOCALIZATION: 0
TOTALSCORE: 0
CONSOLABILITY: 0
BODYLANGUAGE: 0
TOTALSCORE: 0
NEGVOCALIZATION: 0
FACIALEXPRESSION: 0
BREATHING: 0
TOTALSCORE: 0
FACIALEXPRESSION: 0
FACIALEXPRESSION: 0
BREATHING: 0
FACIALEXPRESSION: 0
FACIALEXPRESSION: 0
NEGVOCALIZATION: 0
BODYLANGUAGE: 0
TOTALSCORE: 0
NEGVOCALIZATION: 0
BODYLANGUAGE: 0
BODYLANGUAGE: 0
NEGVOCALIZATION: 0
BREATHING: 0
BODYLANGUAGE: 0
FACIALEXPRESSION: 0
BREATHING: 0
BODYLANGUAGE: 0
TOTALSCORE: 0
NEGVOCALIZATION: 0
CONSOLABILITY: 0
BREATHING: 0
CONSOLABILITY: 0
CONSOLABILITY: 0
BODYLANGUAGE: 0
BODYLANGUAGE: 0
CONSOLABILITY: 0
TOTALSCORE: 0
CONSOLABILITY: 0
TOTALSCORE: 0
BODYLANGUAGE: 0
NEGVOCALIZATION: 0
NEGVOCALIZATION: 0
FACIALEXPRESSION: 0
CONSOLABILITY: 0
FACIALEXPRESSION: 0
TOTALSCORE: 0
TOTALSCORE: 0
NEGVOCALIZATION: 0
BREATHING: 0
CONSOLABILITY: 0
TOTALSCORE: 0
NEGVOCALIZATION: 0
BREATHING: 0
CONSOLABILITY: 0
CONSOLABILITY: 0
BODYLANGUAGE: 0
BREATHING: 0

## 2020-01-16 NOTE — PLAN OF CARE
Problem: Risk for Impaired Skin Integrity  Goal: Tissue integrity - skin and mucous membranes  Description  Structural intactness and normal physiological function of skin and  mucous membranes. Note:   Skin assessed- see flowsheet. Pt refused to get OOB today. Bilat heels with blanchable redness. Elevated off bed. Stage 2 tear between buttocks. See MAR. Pt check and change q2 hrs. On pressure relief overlay. Problem: Falls - Risk of:  Goal: Will remain free from falls  Description  Will remain free from falls  Note:   Fall precautions in place, bed alarm on, nonskid foot wear applied, bed in lowest position, and call light within reach. Camera is in place for pt safety as she is disoriented @ times. Will continue to monitor.

## 2020-01-16 NOTE — PROGRESS NOTES
[facility]    1/16/2020 11:36 AM        Name: Hadley Hobbs .               Admitted: 1/11/2020  Primary Care Provider: Darby Merlin, MD (Tel: [phone])      Subjective:    Seen in bed slightly more confused per family today but waxes and wanes  Reviewed interval ancillary notes    Current Medications  neomycin-polymyxin-hydrocortisone (CORTISPORIN) otic suspension 3 drop, TID  lisinopril (PRINIVIL;ZESTRIL) tablet 20 mg, Daily  linezolid (ZYVOX) tablet 600 mg, 2 times per day  ciprofloxacin (CIPRO) tablet 250 mg, 2 times per day  QUEtiapine (SEROQUEL) tablet 100 mg, Nightly  clopidogrel (PLAVIX) tablet 75 mg, Daily  levothyroxine (SYNTHROID) tablet 125 mcg, Daily  phenytoin (DILANTIN) ER capsule 200 mg, Nightly  sodium chloride flush 0.9 % injection 10 mL, 2 times per day  sodium chloride flush 0.9 % injection 10 mL, PRN  ondansetron (ZOFRAN) injection 4 mg, Q6H PRN  enoxaparin (LOVENOX) injection 40 mg, Daily  dexamethasone (DECADRON) 0.1 % ophthalmic solution 4 drop, BID  menthol-zinc oxide (CALMOSEPTINE) 0.44-20.6 % ointment, PRN        Objective:  BP (!) 159/76   Pulse 80   Temp 97.9 °F (36.6 °C) (Oral)   Resp 16   Ht 5' 7\" (1.702 m)   Wt 113 lb 8 oz (51.5 kg) Comment: HOB flat, overlay, no pump, 2 pillow, 1 sheet  SpO2 95%   BMI 17.78 kg/m²     Intake/Output Summary (Last 24 hours) at 1/16/2020 1136  Last data filed at 1/15/2020 1800  Gross per 24 hour   Intake 480 ml   Output --   Net 480 ml      Wt Readings from Last 3 Encounters:   01/16/20 113 lb 8 oz (51.5 kg)   11/18/19 135 lb (61.2 kg)   11/12/19 135 lb (61.2 kg)       General appearance:  Calm aaox3  HEENT: atraumatic, Pupils equal, muscous membranes moist, no masses appreciated , right ear with no further drainage  Cardiovascular: Regular rate and rhythm no murmurs appreciated  Respiratory: CTAB no wheezing  Gastrointestinal: Abdomen soft, meds    Diet: DIET DYSPHAGIA MINCED AND MOIST; No Drinking Straw  Code:Full Code  DVT PPXlovenox  Disposition pending SNF versus hospice     Aljeandro Diamond MD   1/16/2020 11:36 AM

## 2020-01-16 NOTE — PROGRESS NOTES
Physical Therapy  Facility/Department: [facility] 3A NURSING  Daily Treatment Note  NAME: Roxanna Lewis  : 1929  MRN: [id number]    Date of Service: 2020    Discharge Recommendations:  Roxanna Lewis scored a 9/24 on the AM-PAC short mobility form. Current research shows that an AM-PAC score of 17 or less is typically not associated with a discharge to the patient's home setting. Based on the patients AM-PAC score and their current functional mobility deficits, it is recommended that the patient have 3-5 sessions per week of Physical Therapy at d/c to increase the patients independence. If family opts for Hospice will discharge from caseload. PT Equipment Recommendations  Equipment Needed: No    Assessment   Body structures, Functions, Activity limitations: Decreased functional mobility ; Decreased ADL status; Decreased strength;Decreased safe awareness;Decreased cognition;Decreased endurance;Decreased balance;Decreased posture  Assessment: Pt not feeling well and with limited ability to participate in therapy. 2 person assist for bed mobility and STS transfers. Continued skilled PT to promote return to PLOF unless family opts for Hospice. Prognosis: Fair  PT Education: Goals;PT Role;Plan of Care  Patient Education: D/C recommendations  REQUIRES PT FOLLOW UP: Yes  Activity Tolerance  Activity Tolerance: Treatment limited secondary to medical complications (free text)  Activity Tolerance: pt not feeling well. pt denied dizziness. wasn't be very specific as to why she wasn't feeling well. BP once supine (as machine wasn't working while seated EOB) 148/91 and O2 93%     Patient Diagnosis(es): The primary encounter diagnosis was Urinary tract infection without hematuria, site unspecified. Diagnoses of Otorrhea of right ear and MRSA (methicillin resistant Staphylococcus aureus) infection were also pertinent to this visit.      has a past medical history of Arthritis, Cancer ([facility] [address] Hypercholesterolemia, Hypertension, Neuromuscular disorder ([facility] Neuropathy, Psychiatric problem, Seizures ([facility] Thyroid disease, and Unspecified cerebral artery occlusion with cerebral infarction. has a past surgical history that includes Tonsillectomy; Hysterectomy; Thyroid surgery; Cholecystectomy; fracture surgery; Colonoscopy; joint replacement (Left, 12/12/09); skin biopsy; Cataract removal (Bilateral, 2004); Inner ear surgery (Right, 7/17/2015); and Femur fracture surgery (Right, 07/17/2018). Restrictions  Restrictions/Precautions  Restrictions/Precautions: Fall Risk, Contact Precautions(High fall risk; MRSA)  Position Activity Restriction  Other position/activity restrictions: Laly Servin is a 80 y.o. female who presents with fatigue. Her daughter states that she has been so weak that she is unable to take care of her activities of daily life. Is hard for her to stand up and she is been in bed all day. She states that she has been a little bit confused as well. Subjective   General  Chart Reviewed: Yes  Response To Previous Treatment: Patient with no complaints from previous session. Family / Caregiver Present: No  Subjective  Subjective: Pt stating she wasn't feeling great but wasn't giving any specifics. Once EOB requesting to return supine.    General Comment  Comments: Pt supine in bed upon arrival.  Pain Assessment  Pain Assessment: Faces  Pain Level: 4       Orientation     Cognition      Objective   Bed mobility  Supine to Sit: 2 Person assistance(max a of 2)  Sit to Supine: 2 Person assistance(max A of 2)  Scooting: Dependent/Total  Transfers  Sit to Stand: 2 Person Assistance(mod A of 2 x 1 rep,  max A of 1 x 1 rep;  stood with chair placed in front of patient, max standing 15 second duration)  Stand to sit: 2 Person Assistance(mod A of 2 x 1 rep,  max A of 1 x 1 rep)  Comment: transfer deferred, pt feeling not well and insisting on returning to bed  Ambulation  Ambulation?: No  More Ambulation?: No  Stairs/Curb  Stairs?: No     Balance  Sitting - Static: Poor(max A to maintain sitting balance)  Standing - Static: Poor(max A for standing balance)                           G-Code     OutComes Score                                                     AM-PAC Score  AM-PAC Inpatient Mobility Raw Score : 9 (01/16/20 0941)  AM-PAC Inpatient T-Scale Score : 30.55 (01/16/20 0941)  Mobility Inpatient CMS 0-100% Score: 81.38 (01/16/20 0941)  Mobility Inpatient CMS G-Code Modifier : CM (01/16/20 0941)          Goals--none met   Short term goals  Time Frame for Short term goals: To be met prior to discharge  Short term goal 1: Pt will complete bed mobility with supervision  Short term goal 2: Pt will complete sit to/from stand with CGA  Short term goal 3: Pt will complete bed to/from chair transfer with CGA  Short term goal 4: Pt will ambulate 25 ft with LRAD and min A    Plan    Plan  Times per week: 3-5x  Times per day: Daily  Current Treatment Recommendations: Strengthening, ROM, Balance Training, Functional Mobility Training, Transfer Training, Endurance Training, Gait Training, Stair training, Safety Education & Training, Patient/Caregiver Education & Training, Cognitive Reorientation  Safety Devices  Type of devices:  All fall risk precautions in place, Call light within reach, Gait belt, Patient at risk for falls, Nurse notified, Bed alarm in place, Left in bed  Restraints  Initially in place: No     Therapy Time   Individual Concurrent Group Co-treatment   Time In       0910   Time Out       0937   Minutes       27   Timed Code Treatment Minutes: [address] [facility], [id number]

## 2020-01-16 NOTE — CONSULTS
Facial sensation is intact to pin prick and light touch  VII: Facial strength and movements: intact and symmetric  VIII: Hearing: Intact to finger rub bilaterally  IX: Palate elevation is symmetric  XI: Shoulder shrug is intact  XII: Tongue movements are normal  Musculoskeletal: 5/5 in all 4 extremities. Tone: Normal tone. Reflexes: Symmetric throughout arm and leg 2+. Planters: flexor bilaterally. Coordination: no pronator drift, no dysmetria with FNF in upper extremities. Normal REM. Sensation: normal to all modalities in both arms and legs. Gait/Posture: Not tested due to feeling dizzy and poor cooperation. Data:  LABS:   Lab Results   Component Value Date     01/15/2020    K 4.4 01/15/2020    K 3.8 07/14/2018     01/15/2020    CO2 19 01/15/2020    BUN 9 01/15/2020    CREATININE 0.7 01/15/2020    GFRAA >60 01/15/2020    GFRAA >60 03/10/2013    LABGLOM >60 01/15/2020    GLUCOSE 95 01/15/2020    PHOS 3.6 06/21/2013    MG 2.1 09/12/2011    CALCIUM 8.7 01/15/2020     Lab Results   Component Value Date    WBC 6.4 01/15/2020    RBC 2.85 01/15/2020    HGB 9.8 01/15/2020    HCT 29.7 01/15/2020    .0 01/15/2020    RDW 17.7 01/15/2020     01/15/2020     Lab Results   Component Value Date    INR 1.04 07/14/2018    PROTIME 11.9 07/14/2018       Neuroimaging were independently reviewed by myself and discussed results with the patient  Reviewed notes from different physicians  Reviewed lab and blood testing    Impression:  Acute metabolic encephalopathy secondary to UTI  Possible left vertebral stenosis, asymptomatic. Incidental findings. No need for further neuroimaging at this point as I do not feel the patient will be candidate for any anticoagulation. History of seizure disorder on Dilantin  Hypertension  UTI  Generalized weakness  Hypothyroid  Anemia.  Follow CBC    Recommendation:  Continue current supportive care  Continue Plavix  Check orthostatics  Hydration  Continue Dilantin 200 mg daily  Consider Dilantin level  Seizure precautions  Hydration  Antibiotics  Continue Synthroid and follow TFT  Blood pressure monitor continue current medication  We will get carotid Doppler for further stroke prevention  Will follow if Doppler showed significant stenosis. Thank you for referring such patient. If you have any questions regarding my consult note, please don't hesitate to call me. Vern Fowler MD  [phone]    This dictation was generated by voice recognition computer software.  Although all attempts are made to edit the dictation for accuracy, there may be errors in the  transcription that are not intended

## 2020-01-16 NOTE — CARE COORDINATION
Pt's daughters arrived and state that the hospice nurse told them when they arrive today to have the hospice nurse paged. Called and left vm on Monika Da Silva that family is here.    Marielle Espinoza RN, BSN  [phone]

## 2020-01-16 NOTE — CARE COORDINATION
Daughters informed that one of the hospice nurses will come to see them around noon. That time is not good for them d/t them having to take a family member to work. Asked daughters to pick a time today that is good for them and they state 2pm. I called Amrita Bowser with [address] back and she states that 2 pm is fine. Informed daughters that meeting can be held at 2pm and they state they will be back.   Eliseo Ramesh, RN, BSN  [phone]

## 2020-01-16 NOTE — PROGRESS NOTES
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 Past Surgical History:   Procedure Laterality Date    CATARACT REMOVAL Bilateral 2004    CHOLECYSTECTOMY      COLONOSCOPY      FEMUR FRACTURE SURGERY Right 07/17/2018    right hip gamma nail    FRACTURE SURGERY      left shoulder    HYSTERECTOMY      INNER EAR SURGERY Right 7/17/2015    ACTUAL PROCEDURE: MYRINGOTOMY BILATERAL PRESSURE EQUALIZING TUBE INSERTION RIGHT EAR     JOINT REPLACEMENT Left 12/12/09    knee    SKIN BIOPSY      and removal on back    THYROID SURGERY      TONSILLECTOMY         Family History: All family history was reviewed today. Problem Relation Age of Onset    Asthma Daughter     Ulcerative Colitis Son     Heart Disease Son     Alcohol Abuse Daughter     Alcohol Abuse Son     Stroke Daughter        Objective:       PHYSICAL EXAM:      Vitals:   Vitals:    01/16/20 0445 01/16/20 0600 01/16/20 0954 01/16/20 1333   BP: (!) 145/68  (!) 159/76 127/67   Pulse: 90  80 81   Resp: 16  16 16   Temp: 98 °F (36.7 °C)  97.9 °F (36.6 °C) 97.7 °F (36.5 °C)   TempSrc: Oral  Oral Oral   SpO2: 93%  95% 94%   Weight:  113 lb 8 oz (51.5 kg)     Height:           Physical Exam  Vitals signs and nursing note reviewed. Constitutional:       General: She is not in acute distress. Appearance: She is well-developed. She is not diaphoretic. HENT:      Head: Normocephalic. Right Ear: External ear normal.      Left Ear: External ear normal.      Ears:      Comments: Right ear discharge resolved     Nose: Nose normal.   Eyes:      General: No scleral icterus. Right eye: No discharge. Left eye: No discharge.       Conjunctiva/sclera: Conjunctivae normal.      Pupils: Pupils are equal, round, and reactive to light. Neck:      Musculoskeletal: Normal range of motion and neck supple. Cardiovascular:      Rate and Rhythm: Normal rate and regular rhythm. Heart sounds: No murmur. No friction rub. Pulmonary:      Effort: Pulmonary effort is normal.      Breath sounds: No stridor. No wheezing or rales. Chest:      Chest wall: No tenderness. Abdominal:      Palpations: Abdomen is soft. There is no mass. Tenderness: There is no tenderness. There is no guarding or rebound. Musculoskeletal:         General: No tenderness. Lymphadenopathy:      Cervical: No cervical adenopathy. Skin:     General: Skin is warm and dry. Findings: No erythema or rash. Neurological:      Mental Status: She is alert and oriented to person, place, and time. Motor: No abnormal muscle tone. Psychiatric:         Judgment: Judgment normal.         Lines: All vascular access sites are healthy with no local erythema, discharge or tenderness. Intake and output:    I/O last 3 completed shifts: In: 18 [P.O.:480]  Out: -     Lab Data:   All available labs and old records have been reviewed by me. CBC:  Recent Labs     01/15/20  0531   WBC 6.4   RBC 2.85*   HGB 9.8*   HCT 29.7*      .0*   MCH 34.4*   MCHC 33.1   RDW 17.7*        BMP:  Recent Labs     01/15/20  0531   *   K 4.4      CO2 19*   BUN 9   CREATININE 0.7   CALCIUM 8.7   GLUCOSE 95        Hepatic Function Panel:   Lab Results   Component Value Date    ALKPHOS 191 01/11/2020    ALT 18 01/11/2020    AST 31 01/11/2020    PROT 7.1 01/11/2020    BILITOT 0.8 01/11/2020    BILIDIR <0.2 11/30/2015    IBILI see below 11/30/2015    LABALBU 3.8 01/11/2020       CPK:   Lab Results   Component Value Date    CKTOTAL 89 01/02/2014     ESR: No results found for: SEDRATE  CRP: No results found for: CRP        Imaging: All pertinent images and reports for the current visit were reviewed by me during this visit.     CTA NECK W Z74.01    Infection requiring contact isolation precautions B99.9    Encounter for medication counseling Z71.89    Asymptomatic stenosis of left vertebral artery I65.02    HTN (hypertension), benign I10       Please note that this chart was generated using Dragon dictation software. Although every effort was made to ensure the accuracy of this automated transcription, some errors in transcription may have occurred inadvertently. If you may need any clarification, please do not hesitate to contact me through EPIC or at the phone number provided below with my electronic signature. Any pictures or media included in this note were obtained after taking informed verbal consent from the patient and with their approval to include those in the patient's medical record.     Ousmane Fernandez MD, MPH  1/16/2020 , 2:32 PM   [facility] Infectious Disease   Office: [phone]  Fax: [phone]  Tuesday AM clinic:   [address] [place] [address], [facility] [address]  Thursday AM OHTEYC:[id number], [facility]

## 2020-01-16 NOTE — PLAN OF CARE
Problem: Falls - Risk of:  Goal: Will remain free from falls  Description  Will remain free from falls  Note:   Bed locked in lowest position and 2/4 rails up. Nonskid socks on. Call light and belongings within reach. Bed alarm on. Yellow blanket in place. Pt verbalized understanding. Will continue to monitor. Problem: Risk for Impaired Skin Integrity  Goal: Tissue integrity - skin and mucous membranes  Description  Structural intactness and normal physiological function of skin and  mucous membranes. Note:    Pt turned and repositioned q2h, kept clean and dry. HOB<30. Mattress overlay in place in bed. Heals floated off bed. ABX administered. Calmoseptine applied. Pt eating and hydrated. Will continue to monitor.

## 2020-01-16 NOTE — CARE COORDINATION
Hospice nurse meeting with pt's daughter's at this time. Called and left  for [place] at [facility] to check status of pre-cert.   Yadiel Hooks RN, BSN  [phone]

## 2020-01-16 NOTE — CARE COORDINATION
Spoke to Cassie Hernández at [facility] and informed her that family meeting with hospice today but if they decide not to go with hospice then physician will discharge her and pt will need placement and asked if she could start pre-cert, informed her updated therapy notes are in the chart. She states she is out of the office at this time but will return shortly and look at the notes and get back with me.    Eliseo Ramesh RN, BSN  [phone]

## 2020-01-17 VITALS
BODY MASS INDEX: 18.79 KG/M2 | HEART RATE: 77 BPM | OXYGEN SATURATION: 93 % | TEMPERATURE: 97.9 F | HEIGHT: 67 IN | RESPIRATION RATE: 18 BRPM | WEIGHT: 119.7 LBS | DIASTOLIC BLOOD PRESSURE: 67 MMHG | SYSTOLIC BLOOD PRESSURE: 131 MMHG

## 2020-01-17 PROBLEM — E44.1 MILD MALNUTRITION (HCC): Chronic | Status: ACTIVE | Noted: 2020-01-17

## 2020-01-17 LAB
ANAEROBIC CULTURE: ABNORMAL
BLOOD CULTURE, ROUTINE: NORMAL
GRAM STAIN RESULT: ABNORMAL
ORGANISM: ABNORMAL
ORGANISM: ABNORMAL
WOUND/ABSCESS: ABNORMAL
WOUND/ABSCESS: ABNORMAL

## 2020-01-17 PROCEDURE — 6360000002 HC RX W HCPCS: Performed by: INTERNAL MEDICINE

## 2020-01-17 PROCEDURE — 97530 THERAPEUTIC ACTIVITIES: CPT

## 2020-01-17 PROCEDURE — 6370000000 HC RX 637 (ALT 250 FOR IP): Performed by: INTERNAL MEDICINE

## 2020-01-17 PROCEDURE — 92526 ORAL FUNCTION THERAPY: CPT

## 2020-01-17 PROCEDURE — 99232 SBSQ HOSP IP/OBS MODERATE 35: CPT | Performed by: PSYCHIATRY & NEUROLOGY

## 2020-01-17 RX ORDER — LISINOPRIL 20 MG/1
20 TABLET ORAL DAILY
Qty: 30 TABLET | Refills: 3
Start: 2020-01-18

## 2020-01-17 RX ORDER — NEOMYCIN SULFATE, POLYMYXIN B SULFATE AND HYDROCORTISONE 10; 3.5; 1 MG/ML; MG/ML; [USP'U]/ML
3 SUSPENSION/ DROPS AURICULAR (OTIC) 3 TIMES DAILY
Qty: 1 BOTTLE | Refills: 0
Start: 2020-01-17 | End: 2020-01-27

## 2020-01-17 RX ADMIN — METRONIDAZOLE 500 MG: 250 TABLET, FILM COATED ORAL at 13:53

## 2020-01-17 RX ADMIN — NEOMYCIN SULFATE, POLYMYXIN B SULFATE AND HYDROCORTISONE 3 DROP: 10; 3.5; 1 SUSPENSION/ DROPS AURICULAR (OTIC) at 13:51

## 2020-01-17 RX ADMIN — LISINOPRIL 20 MG: 20 TABLET ORAL at 09:26

## 2020-01-17 RX ADMIN — LINEZOLID 600 MG: 600 TABLET, FILM COATED ORAL at 09:26

## 2020-01-17 RX ADMIN — CIPROFLOXACIN HYDROCHLORIDE 250 MG: 250 TABLET, FILM COATED ORAL at 09:25

## 2020-01-17 RX ADMIN — METRONIDAZOLE 500 MG: 250 TABLET, FILM COATED ORAL at 05:57

## 2020-01-17 RX ADMIN — ENOXAPARIN SODIUM 40 MG: 40 INJECTION SUBCUTANEOUS at 09:26

## 2020-01-17 RX ADMIN — DEXAMETHASONE SODIUM PHOSPHATE 4 DROP: 1 SOLUTION/ DROPS OPHTHALMIC at 09:26

## 2020-01-17 RX ADMIN — LEVOTHYROXINE SODIUM 125 MCG: 125 TABLET ORAL at 05:57

## 2020-01-17 RX ADMIN — NEOMYCIN SULFATE, POLYMYXIN B SULFATE AND HYDROCORTISONE 3 DROP: 10; 3.5; 1 SUSPENSION/ DROPS AURICULAR (OTIC) at 09:26

## 2020-01-17 RX ADMIN — CLOPIDOGREL 75 MG: 75 TABLET, FILM COATED ORAL at 09:25

## 2020-01-17 ASSESSMENT — PAIN SCALES - GENERAL
PAINLEVEL_OUTOF10: 0

## 2020-01-17 NOTE — PROGRESS NOTES
Speech Language Pathology  Dysphagia Treatment Note    Name:  Jack Garcia  :   1929  Medical Diagnosis:  Complicated UTI (urinary tract infection) [M25.3]  Complicated UTI (urinary tract infection) [N39.0]  Treatment Diagnosis: Oropharyngeal Dysphagia  Pain level: Denies    Current Diet Level: Dysphagia II Minced and Moist with Thin liquids, no straws, meds in puree  Tolerance of Current Diet Level: Per RN, intermittent coughing and pt reportedly lying down when eating. Pt also refusing meds in puree. RN denies difficulty when pt provided pills one at a time with water. Assessment of Texture Tolerance:  -Impressions: Pt seen bedside leaning towards right side. SLP assisted with repositioning and a pill placed on right side for support. Pt pleasantly confused. Vocal quality intermittently hoarse/wet prior to intake. Pt provided with puree, soft and regular solids. Prolonged oral phase with reduced mastication and lingual pumping for transit. S/s of premature bolus loss to pharynx with delay in swallow. Pt with intermittent throat clearing and wet vocal that was not texture specific. Pt's lungs are clear with no noted elevation in WBC. Recommend continuing current diet with assist for positioning prior to feeding. If any s/s of dysphagia arise, please hold PO and contact speech therapy. Diet and Treatment Recommendations:  Dysphagia II Minced and Moist with Thin liquids, no straws, meds in puree    ST.) Pt will tolerate recommended diet without s/s of aspiration (2020, Ongoing)  2.) If clinical symptoms of penetration/aspiration continue to be noted,Pt will tolerate MBS to r/o aspiration and determine appropriate diet/liquid level.  (2020, Ongoing)  3.) Pt will tolerate diet advance to least restricted diet, as clinically indicated, with no signs of aspiration (2020, Ongoing)  4.) Pt will improve oral motor function via bolus control exercises /(2020, Ongoing)    Plan:

## 2020-01-17 NOTE — CARE COORDINATION
Discharge Plan:     Patient discharged to: [facility]  SW/DC Planner faxed, [address] and AVS [phone]  Narcotic Prescriptions faxed were:n/a  RN: Liyah Gooden will call report to:     [phone] [address]  Medical Transport with: [facility]  [phone]   time:5:30 pm  Family advised of discharge?:yes RN called daughter   Lawernce Basket?:  yes  All discharge needs met per case management.     Ricky Munguia RN, BSN  [phone]

## 2020-01-17 NOTE — CARE COORDINATION
Received call from [place] at [facility] and she states insurance has requested more clinical information for authorization and she is going to fax it.    Rajwinder Abbott RN, BSN  [phone]

## 2020-01-17 NOTE — PLAN OF CARE
Nutrition Problem: Increased nutrient needs  Intervention: Food and/or Nutrient Delivery: Continue current diet, Start ONS  Nutritional Goals: po intake at least 50% of meals & supplements

## 2020-01-17 NOTE — PROGRESS NOTES
[facility]    1/17/2020 11:40 AM        Name: Lucy Khan               Admitted: 1/11/2020  Primary Care Provider: Maya Joaquin MD (Tel: [phone])      Subjective:    Sitting in bed more calm today no chest pain or sob , no fevers or chills  Reviewed interval ancillary notes    Current Medications  metroNIDAZOLE (FLAGYL) tablet 500 mg, 3 times per day  neomycin-polymyxin-hydrocortisone (CORTISPORIN) otic suspension 3 drop, TID  lisinopril (PRINIVIL;ZESTRIL) tablet 20 mg, Daily  linezolid (ZYVOX) tablet 600 mg, 2 times per day  ciprofloxacin (CIPRO) tablet 250 mg, 2 times per day  QUEtiapine (SEROQUEL) tablet 100 mg, Nightly  clopidogrel (PLAVIX) tablet 75 mg, Daily  levothyroxine (SYNTHROID) tablet 125 mcg, Daily  phenytoin (DILANTIN) ER capsule 200 mg, Nightly  sodium chloride flush 0.9 % injection 10 mL, 2 times per day  sodium chloride flush 0.9 % injection 10 mL, PRN  ondansetron (ZOFRAN) injection 4 mg, Q6H PRN  enoxaparin (LOVENOX) injection 40 mg, Daily  dexamethasone (DECADRON) 0.1 % ophthalmic solution 4 drop, BID  menthol-zinc oxide (CALMOSEPTINE) 0.44-20.6 % ointment, PRN        Objective:  BP (!) 153/71   Pulse 79   Temp 98.2 °F (36.8 °C) (Oral)   Resp 18   Ht 5' 7\" (1.702 m)   Wt 119 lb 11.2 oz (54.3 kg)   SpO2 98%   BMI 18.75 kg/m²     Intake/Output Summary (Last 24 hours) at 1/17/2020 1140  Last data filed at 1/17/2020 0915  Gross per 24 hour   Intake 960 ml   Output --   Net 960 ml      Wt Readings from Last 3 Encounters:   01/17/20 119 lb 11.2 oz (54.3 kg)   11/18/19 135 lb (61.2 kg)   11/12/19 135 lb (61.2 kg)       General appearance:  Calm aaox3  HEENT: atraumatic, Pupils equal, muscous membranes moist, no masses appreciated , right ear with no further drainage  Cardiovascular: Regular rate and rhythm no murmurs appreciated  Respiratory: CTAB no wheezing  Gastrointestinal: Abdomen soft, non-tender, BS+  EXT: no edema  Neurology: no gross focal deficts  Psychiatry:calm  Skin: Warm, dry, no rashes appreciated    Labs and Tests:  CBC:   Recent Labs     01/15/20  0531   WBC 6.4   HGB 9.8*        BMP:    Recent Labs     01/15/20  0531   *   K 4.4      CO2 19*   BUN 9   CREATININE 0.7   GLUCOSE 95     Hepatic:   No results for input(s): AST, ALT, ALB, BILITOT, ALKPHOS in the last 72 hours. VL DUP CAROTID BILATERAL   Final Result      CTA NECK W CONTRAST         MRI BRAIN WO CONTRAST   Final Result   No acute infarct. Partial loss of normal signal void within the distal left vertebral artery   which may indicate slow flow versus partial thrombosis. Severe right mastoid effusion with opacification of the middle ear and EAC. Correlate for infection. Recent imaging reviewed    Problem List  Active Problems:    Seizure disorder ([address]    Urinary tract infection without hematuria    Complicated UTI (urinary tract infection)    Otorrhea of right ear    MRSA (methicillin resistant Staphylococcus aureus) infection    Acute metabolic encephalopathy    E coli infection    History of arterial ischemic stroke    Bedbound    Infection requiring contact isolation precautions    Encounter for medication counseling    Asymptomatic stenosis of left vertebral artery    HTN (hypertension), benign    Anaerobic bacterial infection    Mild malnutrition ([address]  Resolved Problems:    * No resolved hospital problems.  *       Assessment & Plan:   Visual hallucinations: will get mri to r/o organic cause looked at prev pcp notes has been happening at home  -MRI neg, improved on seroquel,       Acute complicated  E coli UTI  - cipro day 6/10    Acute otitis media with spontaneous perforation of the right tympanic membrane and serous otitis media of the left ear, MRSA  -linezolid oral day 3/10      Dehydration: elevated bun  - resolved    Stress hyperglycemia: a1c of

## 2020-01-17 NOTE — PLAN OF CARE
Problem: Risk for Impaired Skin Integrity  Goal: Tissue integrity - skin and mucous membranes  Description  Structural intactness and normal physiological function of skin and  mucous membranes. Intervention: SKIN ASSESSMENT  Note:   Skin assessed- see flowsheet. Pt on pressure relief overlay and heels elevated off bed. Angelita used on buttocks. Pt turn q2 hour     Problem: Falls - Risk of:  Goal: Will remain free from falls  Description  Will remain free from falls  Note:   Fall precautions in place, bed alarm on, nonskid foot wear applied, bed in lowest position, and call light within reach. Camera at bedside for pt safety. Will continue to monitor.

## 2020-01-17 NOTE — DISCHARGE SUMMARY
Exam:    /67   Pulse 77   Temp 97.9 °F (36.6 °C) (Oral)   Resp 18   Ht 5' 7\" (1.702 m)   Wt 119 lb 11.2 oz (54.3 kg)   SpO2 93%   BMI 18.75 kg/m²   General appearance:  Calm aaox3  HEENT: atraumatic, Pupils equal, muscous membranes moist, no masses appreciated , right ear with no further drainage  Cardiovascular: Regular rate and rhythm no murmurs appreciated  Respiratory: CTAB no wheezing  Gastrointestinal: Abdomen soft, non-tender, BS+  EXT: no edema  Neurology: no gross focal deficts  Psychiatry:calm  Skin: Warm, dry, no rashes appreciated  Discharge Medications:   Current Discharge Medication List      START taking these medications    Details   neomycin-polymyxin-hydrocortisone (CORTISPORIN) 3.5-99707-1 otic suspension Place 3 drops into the right ear 3 times daily for 10 days  Qty: 1 Bottle, Refills: 0      metroNIDAZOLE (FLAGYL) 500 MG tablet Take 1 tablet by mouth every 8 hours for 14 days  Qty: 42 tablet, Refills: 0      Lactobacillus (PROBIOTIC ACIDOPHILUS) TABS Take 1 tablet by mouth 2 times daily for 10 days  Qty: 20 tablet, Refills: 0      ciprofloxacin (CIPRO) 250 MG tablet Take 1 tablet by mouth every 12 hours for 14 days  Qty: 28 tablet, Refills: 0      linezolid (ZYVOX) 600 MG tablet Take 1 tablet by mouth every 12 hours for 14 days  Qty: 28 tablet, Refills: 0           Current Discharge Medication List      CONTINUE these medications which have CHANGED    Details   lisinopril (PRINIVIL;ZESTRIL) 20 MG tablet Take 1 tablet by mouth daily  Qty: 30 tablet, Refills: 3           Current Discharge Medication List      CONTINUE these medications which have NOT CHANGED    Details   cyanocobalamin 1000 MCG/ML injection INJECT 1 ML INTO THE MUSCLE EVERY 30 DAYS  Qty: 3 mL, Refills: 1      ibuprofen (ADVIL;MOTRIN) 800 MG tablet TAKE 1 TABLET BY MOUTH 3 TIMES DAILY (WITH MEALS)  Qty: 270 tablet, Refills: 0      gabapentin (NEURONTIN) 400 MG capsule TAKE 1 CAPSULE BY MOUTH TWO TIMES DAILY  Qty: 180 capsule, Refills: 0      QUEtiapine (SEROQUEL) 100 MG tablet Take 2 tablets by mouth nightly  Qty: 60 tablet, Refills: 2      levothyroxine (SYNTHROID) 125 MCG tablet TAKE 1 TABLET BY MOUTH  EVERY DAY  Qty: 90 tablet, Refills: 1      phenytoin (DILANTIN) 100 MG ER capsule TAKE 2 CAPSULES BY MOUTH  EVERY EVENING AT BEDTIME  Qty: 180 capsule, Refills: 1      clopidogrel (PLAVIX) 75 MG tablet TAKE 1 TABLET BY MOUTH  EVERY DAY  Qty: 90 tablet, Refills: 1      docusate sodium (COLACE) 100 MG capsule TAKE 1 CAPSULE BY MOUTH EVERY DAY  Qty: 90 capsule, Refills: 1      Pramox-PE-Glycerin-Petrolatum (PREPARATION H) 1-0.25-14.4-15 % CREA rectal cream Place rectally 2 times daily      ondansetron (ZOFRAN) 4 MG tablet TAKE 1 TABLET BY MOUTH DAILY AS NEEDED FOR NAUSEA OR VOMITING  Qty: 30 tablet, Refills: 0      Dextromethorphan-Guaifenesin (MUCINEX DM)  MG TB12 Take by mouth nightly           Current Discharge Medication List          Labs:  For convenience and continuity at follow-up the following most recent labs are provided:    Lab Results   Component Value Date    WBC 6.4 01/15/2020    HGB 9.8 01/15/2020    HCT 29.7 01/15/2020    .0 01/15/2020     01/15/2020     01/15/2020    K 4.4 01/15/2020    K 3.8 07/14/2018     01/15/2020    CO2 19 01/15/2020    BUN 9 01/15/2020    CREATININE 0.7 01/15/2020    CALCIUM 8.7 01/15/2020    PHOS 3.6 06/21/2013    ALKPHOS 191 01/11/2020    ALT 18 01/11/2020    AST 31 01/11/2020    BILITOT 0.8 01/11/2020    BILIDIR <0.2 11/30/2015    LABALBU 3.8 01/11/2020    LDLCALC 107 09/15/2014    TRIG 60 09/15/2014     Lab Results   Component Value Date    INR 1.04 07/14/2018    INR 0.99 07/06/2018    INR 1.06 03/25/2011       Radiology:  Vl Dup Carotid Bilateral    Result Date: 1/17/2020  Carotid Duplex Study  Demographics   Patient Name      Jenifer Ravi   Date of Study     01/16/2020          Gender              Female   Patient Number    [id number]          Date of Blood Pressure:Right arm 164/ mmHg. Left arm 150/ mmHg. Patient Status:Routine. [facility] - Vascular Lab. Technical Quality:Adequate visualization. Plaque   - A plaque was found in the Right ICA. Irregular. The plaque characteristics are: heterogeneous texture. - A plaque was found in the Left ICA. Irregular. The plaque characteristics are: heterogeneous texture. There is evidence of calcified plaque. Velocities are measured in cm/s ; Diameters are measured in mm Carotid Right Measurements +---------------+----+----+-----+----+ ! Location       ! PSV ! EDV ! Angle! RI  ! +---------------+----+----+-----+----+ ! Prox CCA       !63.4!4.11!12   !0.91! +---------------+----+----+-----+----+ ! Mid CCA        !85. 1!11. 1! 54   !0.87! +---------------+----+----+-----+----+ ! Dist CCA       !79  !11. 5!60   !0.85! +---------------+----+----+-----+----+ ! Prox ICA       !98.5!21. 8!46   !0.78! +---------------+----+----+-----+----+ ! Mid ICA        !86. 0!57. 7!46   !0.78! +---------------+----+----+-----+----+ ! Dist ICA       !56. 4!15. 7!60   !0.72! +---------------+----+----+-----+----+ ! Prox ECA       !741 !    !61   !    ! +---------------+----+----+-----+----+ ! Vertebral      !64.8!    !60   !    ! +---------------+----+----+-----+----+ ! Prox Subclavian!99.1! ! 48   !    ! +---------------+----+----+-----+----+   - There is antegrade vertebral flow noted on the right side. - Additional Measurements:ICAPSV/CCAPSV 1.16. ICAEDV/CCAEDV 2.85. Carotid Left Measurements +---------------+----+----+-----+----+ ! Location       ! PSV ! EDV ! Angle! RI  ! +---------------+----+----+-----+----+ ! Prox CCA       !78.5!11. 4!52   !0.85! +---------------+----+----+-----+----+ ! Mid CCA        !95. 3!15. 4!60   !0.84! +---------------+----+----+-----+----+ ! Dist CCA       !124 !13. 3! 60   !0.89! +---------------+----+----+-----+----+ ! Prox ICA       !123 !11. 4!46   !0.91! +---------------+----+----+-----+----+ ! Radha VAZQUEZ ! 85. 7!36. 3!56   !0.81! +---------------+----+----+-----+----+ ! Dist ICA       !46. 3! 13  !56   !0.72! +---------------+----+----+-----+----+ ! Prox ECA       !332 ! !52   !    ! +---------------+----+----+-----+----+ ! Vertebral      !21.6!    !60   !    ! +---------------+----+----+-----+----+ ! Prox Subclavian! 98.5! ! 46   !    ! +---------------+----+----+-----+----+   - There is alternating vertebral flow noted on the left side. - Additional Measurements:ICAPSV/CCAPSV 1.29. ICAEDV/CCAEDV 1.43. Mri Brain Wo Contrast    Result Date: 1/14/2020  EXAMINATION: MRI OF THE BRAIN WITHOUT CONTRAST  1/14/2020 5:12 pm TECHNIQUE: Multiplanar multisequence MRI of the brain was performed without the administration of intravenous contrast. COMPARISON: None. HISTORY: ORDERING SYSTEM PROVIDED HISTORY: visual hallucinations TECHNOLOGIST PROVIDED HISTORY: Reason for exam:->visual hallucinations Reason for Exam: Visual hallucinations 6 months worsening since admitted to hospital. MRSA right ear and UTI. Acuity: Acute Type of Exam: Initial FINDINGS: INTRACRANIAL STRUCTURES/VENTRICLES: There is no acute infarct. There is volume loss with moderate to severe chronic white matter microvascular ischemic disease characterized by confluent periventricular white matter signal abnormality. Chronic infarcts are present within the deep white matter and deep gray matter of both cerebral hemispheres. There is partial loss of normal signal void within the distal left vertebral artery which may reflect slow flow or partial thrombosis. ORBITS: The visualized portion of the orbits demonstrate no acute abnormality. SINUSES: There is a severe right mastoid effusion with opacification of the middle ear and external auditory canal.  There is a trace left mastoid effusion. BONES/SOFT TISSUES: The bone marrow signal intensity appears normal. The soft tissues demonstrate no acute abnormality. No acute infarct.  Partial loss of normal signal void within the distal left vertebral artery which may indicate slow flow versus partial thrombosis. Severe right mastoid effusion with opacification of the middle ear and EAC. Correlate for infection.          Signed:    Melanie Sexton MD   1/17/2020

## 2020-01-17 NOTE — PROGRESS NOTES
Physical Therapy  Facility/Department: [facility] 3A NURSING  Daily Treatment Note  NAME: Matteo Aggarwal  : 1929  MRN: [id number]    Date of Service: 2020    Discharge Recommendations:Shilpa Castañeda scored a 10/24 on the AM-PAC short mobility form. Current research shows that an AM-PAC score of 17 or less is typically not associated with a discharge to the patient's home setting. Based on the patients AM-PAC score and their current functional mobility deficits, it is recommended that the patient have 3-5 sessions per week of Physical Therapy at d/c to increase the patients independence. PT Equipment Recommendations  Equipment Needed: No    Assessment   Body structures, Functions, Activity limitations: Decreased functional mobility ; Decreased ADL status; Decreased strength;Decreased safe awareness;Decreased cognition;Decreased endurance;Decreased balance;Decreased posture  Assessment: The pt required less assistance for transfes today, though she does continue to require 2 person assistance. She has poor standing and activity tolerance. Prognosis: Fair  PT Education: Goals;PT Role;Plan of Care  Patient Education: D/C recommendations  Barriers to Learning: hearing  REQUIRES PT FOLLOW UP: Yes  Activity Tolerance  Activity Tolerance: Patient Tolerated treatment well;Patient limited by endurance     Patient Diagnosis(es): The primary encounter diagnosis was Urinary tract infection without hematuria, site unspecified. Diagnoses of Otorrhea of right ear and MRSA (methicillin resistant Staphylococcus aureus) infection were also pertinent to this visit. has a past medical history of Arthritis, Cancer ([address] Hypercholesterolemia, Hypertension, Neuromuscular disorder ([address] Neuropathy, Psychiatric problem, Seizures ([address] Thyroid disease, and Unspecified cerebral artery occlusion with cerebral infarction. has a past surgical history that includes Tonsillectomy; Hysterectomy;  Thyroid surgery; Assistance;Minimal Assistance(min A of 2 first rep, min A of 1 second rep)  Stand to sit: Contact guard assistance  Comment: transfer deferred, pt feeling not well and insisting on returning to bed  Ambulation  Ambulation?: No(unsafe to dizziness after 1 minute of standing)     Balance  Posture: Poor(forward head and trunk in standing)  Sitting - Static: Fair  Standing - Static: Fair;-(CGA with RW)  Standing - Dynamic: Poor  Comments: The pt stood with the RW 2 reps for 1 minute each stand, CGA for balance, OT assisted with pericare, pt requested to sit due to dizziness, /74, HR 83          AM-PAC Score  AM-PAC Inpatient Mobility Raw Score : 10 (01/17/20 1343)  AM-PAC Inpatient T-Scale Score : 32.29 (01/17/20 1343)  Mobility Inpatient CMS 0-100% Score: 76.75 (01/17/20 1343)  Mobility Inpatient CMS G-Code Modifier : CL (01/17/20 1343)          Goals  Short term goals  Time Frame for Short term goals: To be met prior to discharge  Short term goal 1: Pt will complete bed mobility with supervision  Short term goal 2: Pt will complete sit to/from stand with CGA  Short term goal 3: Pt will complete bed to/from chair transfer with CGA  Short term goal 4: Pt will ambulate 25 ft with LRAD and min A  *no goals met    Plan    Plan  Times per week: 3-5x  Times per day: Daily  Current Treatment Recommendations: Strengthening, ROM, Balance Training, Functional Mobility Training, Transfer Training, Endurance Training, Gait Training, Stair training, Safety Education & Training, Patient/Caregiver Education & Training, Cognitive Reorientation  Safety Devices  Type of devices:  All fall risk precautions in place, Bed alarm in place, Call light within reach, Nurse notified, Left in bed  Restraints  Initially in place: No     Therapy Time   Individual Concurrent Group Co-treatment   Time In       1253   Time Out       1331   Minutes       38   Timed Code Treatment Minutes: [facility], [address] [facility] [id number]

## 2020-01-17 NOTE — PROGRESS NOTES
2 times per day Abundio Fernandes MD   250 mg at 01/17/20 6423    QUEtiapine (SEROQUEL) tablet 100 mg  100 mg Oral Nightly Roxy Macdonald MD   100 mg at 01/16/20 2003    clopidogrel (PLAVIX) tablet 75 mg  75 mg Oral Daily Argentina Hemphill MD   75 mg at 01/17/20 6186    levothyroxine (SYNTHROID) tablet 125 mcg  125 mcg Oral Daily Argentina Hemphill MD   125 mcg at 01/17/20 0557    phenytoin (DILANTIN) ER capsule 200 mg  200 mg Oral Nightly Chadd Orr MD   200 mg at 01/16/20 2003    sodium chloride flush 0.9 % injection 10 mL  10 mL Intravenous 2 times per day Argentina Hemphill MD   10 mL at 01/15/20 1026    sodium chloride flush 0.9 % injection 10 mL  10 mL Intravenous PRN Chadd Orr MD        ondansetron (ZOFRAN) injection 4 mg  4 mg Intravenous Q6H PRN Chadd Orr MD        enoxaparin (LOVENOX) injection 40 mg  40 mg Subcutaneous Daily Chadd Orr MD   40 mg at 01/17/20 0926    dexamethasone (DECADRON) 0.1 % ophthalmic solution 4 drop  4 drop Right Ear BID Cyndi Angeles MD   4 drop at 01/17/20 0926    menthol-zinc oxide (CALMOSEPTINE) 0.44-20.6 % ointment   Topical PRN Roxy Macdonald MD         Allergies   Allergen Reactions    Aspirin Other (See Comments)     unknown    Butalbital-Aspirin-Caffeine Other (See Comments)     unknown    Daypro [Oxaprozin] Other (See Comments)     unknown    Diclofenac Sodium Other (See Comments)     unknown    Erythromycin Swelling    Naprosyn [Naproxen]     Penicillins      Pt denies allergy to pcn    Sulfa Antibiotics Swelling      reports that she has never smoked. She has never used smokeless tobacco. She reports that she does not drink alcohol or use drugs.        Objective:  Exam:   Constitutional:   Vitals:    01/17/20 0210 01/17/20 0643 01/17/20 0915 01/17/20 1350   BP: 126/67 (!) 155/71 (!) 153/71 131/67   Pulse: 79 84 79 77   Resp: 18 18 18 18   Temp: 97.8 °F (36.6 °C) 98.8 °F (37.1 °C) 98.2 °F (36.8 °C) 97.9 °F (36.6 °C)   TempSrc: Oral Oral Oral Oral   SpO2: 96% 96% 98% 93%   Weight:  119 lb 11.2 oz (54.3 kg)     Height:         General appearance:  Normal development and appear in no acute distress. Eye: No icterus. Neck: supple  Cardiovascular:  No lower leg edema with good pulsation. Mental Status:   Oriented to person, place, problem, and time. Memory: Good immediate recall. Intact remote memory  Normal attention span and concentration. Language: intact naming, repeating and fluency   Good fund of Knowledge. Cranial Nerves:   II: Visual fields: Full. Pupils: equal, round, reactive to light  III,IV,VI: Extra Ocular Movements are intact. No nystagmus  V: Facial sensation is intact  VII: Facial strength and movements: intact and symmetric  IX: Palate elevation is symmetric  XI: Shoulder shrug is intact  XII: Tongue movements are normal  Musculoskeletal: No focal weakness. .   Tone: Normal tone. Reflexes: Bilateral biceps 2/4, triceps 2/4, brachial radialis 2/4, knee 2/4 and ankle 2/4. Planters: flexor bilaterally. Coordination: no pronator drift, no dysmetria with FNF. Normal REM. Sensation: normal to all modalities in both arms and legs.   Gait/Posture: steady gait        Data:  LABS:   Lab Results   Component Value Date     01/15/2020    K 4.4 01/15/2020    K 3.8 07/14/2018     01/15/2020    CO2 19 01/15/2020    BUN 9 01/15/2020    CREATININE 0.7 01/15/2020    GFRAA >60 01/15/2020    GFRAA >60 03/10/2013    LABGLOM >60 01/15/2020    GLUCOSE 95 01/15/2020    PHOS 3.6 06/21/2013    MG 2.1 09/12/2011    CALCIUM 8.7 01/15/2020     Lab Results   Component Value Date    WBC 6.4 01/15/2020    RBC 2.85 01/15/2020    HGB 9.8 01/15/2020    HCT 29.7 01/15/2020    .0 01/15/2020    RDW 17.7 01/15/2020     01/15/2020     Lab Results   Component Value Date    INR 1.04 07/14/2018    PROTIME 11.9 07/14/2018       Neuroimaging was independently reviewed by me and discussed results with the patient   I reviewed blood testing and other test results and discussed results with the patient. Impression: No change  Acute metabolic encephalopathy secondary to UTI  Possible left vertebral stenosis, asymptomatic. Incidental findings. No need for further neuroimaging at this point as I do not feel the patient will be candidate for any anticoagulation. History of seizure disorder on Dilantin  Hypertension  UTI  Generalized weakness  Hypothyroid      Recommendation    Continue current supportive care  Continue Dilantin the same dose. Will not increase the dose due to increased risk of side effect  PT and OT  Speech evaluation  Seizure precaution  Hydration  Antibiotics  Continue current blood pressure medications  DC planning when medically stable  No further recommendation  We will sign off. Yahaira Forman MD   [phone]      This dictation was generated by voice recognition computer software. Although all attempts are made to edit the dictation for accuracy, there may be errors in the transcription that are not intended.

## 2020-01-17 NOTE — PROGRESS NOTES
Hypertension, Neuromuscular disorder ([facility] [address] Neuropathy, Psychiatric problem, Seizures ([facility] [address] Thyroid disease, and Unspecified cerebral artery occlusion with cerebral infarction. has a past surgical history that includes Tonsillectomy; Hysterectomy; Thyroid surgery; Cholecystectomy; fracture surgery; Colonoscopy; joint replacement (Left, 12/12/09); skin biopsy; Cataract removal (Bilateral, 2004); Inner ear surgery (Right, 7/17/2015); and Femur fracture surgery (Right, 07/17/2018). Restrictions  Restrictions/Precautions  Restrictions/Precautions: Fall Risk, Contact Precautions(high fall risk)  Position Activity Restriction  Other position/activity restrictions: Vitor Kenney is a 80 y.o. female who presents with fatigue. Her daughter states that she has been so weak that she is unable to take care of her activities of daily life. Is hard for her to stand up and she is been in bed all day. She states that she has been a little bit confused as well. Diagnosed with UTI and acute otitis media with spontaneous perforation of the right tympanic membrane and serous otitis media of the left ear  Subjective   General  Chart Reviewed: Yes  Patient assessed for rehabilitation services?: Yes  Response to previous treatment: Patient with no complaints from previous session  Family / Caregiver Present: No  Referring Practitioner:    Diagnosis: UTI  Subjective  Subjective: Pt lying supine in bed upon arrival, agreeable to co-tx. General Comment  Comments:    Vital Signs  Patient Currently in Pain: Denies   Orientation  Orientation  Overall Orientation Status: Within Functional Limits(oriented to person, place, and time)  Objective    ADL  LE Dressing: Dependent/Total(to doff soiled depends, to don new depends)  Toileting: Dependent/Total(incontinent of urine and BM)  Additional Comments: Pt reported that she had gone to the bathroom in her depends. Pt transferred to EOB and stood x2 with RW for nehemiah care and depends change. Pt reported dizziness after stands - /74, HR 84. Pt declined to transfer to chair despite encouragement. After pt transferred back into bed, pt assisted with additional nehemiah care. Pt also c/o need to have another BM. Pt declined transfer up to [facility] and requested bedpan. Pt on bedpan x3 min and reported that she could not go. Pt rolled/bridged for depends management. Pt left positioned on L side at end of session. Balance  Sitting Balance: Stand by assistance  Standing Balance: Contact guard assistance(with RW)  Standing Balance  Time: 2x~1 min  Activity: nehemiah care, depends management  Comment: with RW  Functional Mobility  Functional Mobility Comments: pt declined, requested to transfer back into bed after stances  Bed mobility  Bridging: Moderate assistance  Rolling to Left: Moderate assistance(min A progressing to mod A)  Rolling to Right: Moderate assistance(min A progressing to mod A)  Supine to Sit: Minimal assistance(HOB slightly elevated, cueing)  Sit to Supine: Dependent/Total(mod A of 2)  Scooting: Moderate assistance  Comment: pt rolled R and L several times for additional nehemiah care, depends management, and repositioning; bridged x2 with mod A to pull up depends  Transfers  Sit to stand: Dependent/Total(min A of 2 progressing to min A of 1 to RW)  Stand to sit: Minimal assistance(to EOB x2)                       Cognition  Overall Cognitive Status: Exceptions  Arousal/Alertness: Delayed responses to stimuli  Following Commands:  Follows one step commands with repetition  Attention Span: Attends with cues to redirect  Memory: Decreased short term memory;Decreased recall of precautions  Safety Judgement: Decreased awareness of need for assistance;Decreased awareness of need for safety  Problem Solving: Assistance required to implement solutions;Assistance required to identify errors made;Assistance required to generate solutions;Assistance required to correct errors made  Insights: Decreased

## 2020-01-18 LAB — CULTURE, BLOOD 2: NORMAL

## 2020-01-18 NOTE — PROGRESS NOTES
Data- discharge order received, pt or daughter (appointed legal authority) verbalized agreement to discharge, [phone] [address] reviewed and signed by physician. Action- AVS prepared, YESI completed/ reported faxed by case management/. Discharge instruction summary: Diet- dysphagia 3, Activity- as kelley, immunizations reviewed , medications prescriptions to be filled at receiving facility except for the controlled prescriptions to be sent: n/a, Transfer code status: Full Code, LDAs to remain with discharge: n/a. DME used: n/a. Response- Bedside RN to call report to receiving facility. Pt belongings gathered, peripheral IV and cardiac monitoring removed. Disposition to Discharged via ambulance to skilled nursing by EMS transportation, no complications reported.

## 2020-01-20 NOTE — PROGRESS NOTES
Occupational Therapy Discharge Summary    Name: Una Springer  : 1929    The pt was evaluated by OT on  and seen for 2 treatment sessions prior to DC to [place] [id number]  on  per MD order. The pt's acute therapy goals were:  Short term goals  Time Frame for Short term goals: by discharge   Short term goal 1: Complete functional transfers with supervision and LRAD - min A of 2 progressing to min A of 1   Short term goal 2: Complete toileting with SBA - dep   Short term goal 3: Complete standing ADL at sink with SBA - goal not addressed   Short term goal 4: New goal: Pt will complete functional mobility with CGA. Patient met 0 goals during stay. Number of Refusals:0  Number of Holds: 0  During this hospitalization, the patient was educated on:  Patient Education: role of OT, safety, calling for assistance with call light, transfer training, ADL training, importance of OOB activity    DC pt from OT caseload at this time. Thank you! Koffi Simon 103

## 2020-01-21 ENCOUNTER — TELEPHONE (OUTPATIENT)
Dept: FAMILY MEDICINE CLINIC | Age: 85
End: 2020-01-21

## 2020-01-21 NOTE — TELEPHONE ENCOUNTER
[facility] Transitions Initial Follow Up Call    Outreach made within 2 business days of discharge: Yes    Patient: Gregorio Johnson Patient : 1929   MRN: [id number]  Reason for Admission: There are no discharge diagnoses documented for the most recent discharge. Discharge Date: 20       Spoke with: Shanell Johnson, Emergency contact    Discharge department/facility: [facility]    TCM Interactive Patient Contact:  Was patient able to fill all prescriptions: Yes  Was patient instructed to bring all medications to the follow-up visit: Yes  Is patient taking all medications as directed in the discharge summary? Yes  Does patient understand their discharge instructions: Yes  Does patient have questions or concerns that need addressed prior to 7-14 day follow up office visit: no    Patient is currently in a rehab facility, and will be starting with hospice when she is released to her home. Follow Up  No future appointments.     Vale Romero

## 2020-01-28 ENCOUNTER — TELEPHONE (OUTPATIENT)
Dept: FAMILY MEDICINE CLINIC | Age: 85
End: 2020-01-28

## 2020-01-29 ENCOUNTER — TELEPHONE (OUTPATIENT)
Dept: FAMILY MEDICINE CLINIC | Age: 85
End: 2020-01-29

## 2020-01-29 NOTE — TELEPHONE ENCOUNTER
Wanted to hospice will be faxing a request for a order for hospice.  Needs to be completed and returned so they can bring the bed to home

## 2020-02-10 ENCOUNTER — CARE COORDINATION (OUTPATIENT)
Dept: CASE MANAGEMENT | Age: 85
End: 2020-02-10

## 2020-05-27 RX ORDER — LEVOTHYROXINE SODIUM 0.12 MG/1
TABLET ORAL
Qty: 90 TABLET | Refills: 1 | OUTPATIENT
Start: 2020-05-27

## 2020-05-27 RX ORDER — PHENYTOIN SODIUM 100 MG/1
CAPSULE, EXTENDED RELEASE ORAL
Qty: 180 CAPSULE | Refills: 1 | OUTPATIENT
Start: 2020-05-27

## 2020-05-27 RX ORDER — CLOPIDOGREL BISULFATE 75 MG/1
TABLET ORAL
Qty: 90 TABLET | Refills: 1 | OUTPATIENT
Start: 2020-05-27